# Patient Record
Sex: MALE | Race: BLACK OR AFRICAN AMERICAN | NOT HISPANIC OR LATINO | Employment: FULL TIME | URBAN - METROPOLITAN AREA
[De-identification: names, ages, dates, MRNs, and addresses within clinical notes are randomized per-mention and may not be internally consistent; named-entity substitution may affect disease eponyms.]

---

## 2017-01-04 ENCOUNTER — TELEPHONE (OUTPATIENT)
Dept: PHARMACY | Facility: CLINIC | Age: 27
End: 2017-01-04

## 2017-01-06 ENCOUNTER — TELEPHONE (OUTPATIENT)
Dept: PHARMACY | Facility: CLINIC | Age: 27
End: 2017-01-06

## 2017-01-10 ENCOUNTER — TELEPHONE (OUTPATIENT)
Dept: PHARMACY | Facility: CLINIC | Age: 27
End: 2017-01-10

## 2017-01-11 ENCOUNTER — TELEPHONE (OUTPATIENT)
Dept: PHARMACY | Facility: CLINIC | Age: 27
End: 2017-01-11

## 2017-01-11 NOTE — TELEPHONE ENCOUNTER
[facility] has been unable to reach patient regarding need of Xolair.  I do see that patient has been receiving injections every 14 days via facility administered medication.      Please let us know if this needs to be fulfilled via patient's pharmacy benefit.  If patient does need from pharmacy, please send a prescription for q 14 day dosing.

## 2017-01-11 NOTE — TELEPHONE ENCOUNTER
Has patient's medical benefit started covering his Xolair injections again?  Please advise.     Thanks,     Fre Saab, PharmD   [facility]   [phone] (Routing comment)                        Fer Saab, PharmD 26 minutes ago (3:50 PM)                 [facility] has been unable to reach patient regarding need of Xolair. I do see that patient has been receiving injections every 14 days via facility administered medication.      Please let us know if this needs to be fulfilled via patient's pharmacy benefit. If patient does need from pharmacy, please send a prescription for q 14 day dosing.                  Documentation                          Tania Voss attempted to contact  Oscar Luis Carlos  [phone]  1 hour ago (2:50 PM)                   Outgoing call:  refill call for xoalir, tried reaching patient 4 times to confirm need of refill, no answer, both numbers on file for patient have been called, will f/u by postcard un hopes in reaching patient to confirm need of refill

## 2017-01-13 NOTE — TELEPHONE ENCOUNTER
Patient had mediation from [facility] that was used for his most recent injections.     Patient called and informed to contact [facility] as soon as possible at [phone]. Mr. Aldridge voiced understanding.

## 2017-01-19 ENCOUNTER — TELEPHONE (OUTPATIENT)
Dept: PHARMACY | Facility: CLINIC | Age: 27
End: 2017-01-19

## 2017-01-20 ENCOUNTER — TELEPHONE (OUTPATIENT)
Dept: PHARMACY | Facility: CLINIC | Age: 27
End: 2017-01-20

## 2017-09-11 ENCOUNTER — TELEPHONE (OUTPATIENT)
Dept: PHARMACY | Facility: CLINIC | Age: 27
End: 2017-09-11

## 2017-09-11 NOTE — TELEPHONE ENCOUNTER
[facility] received prescription for Xolair 300mg SQ every 28 days.  Prior authorization is required.      Dosage appropriate based on therapy for Asthma: SubQ: Dose and frequency based on body weight and pretreatment total IgE serum levels.      DDIs: medication list reviewed, none expected with Xolair

## 2017-09-11 NOTE — TELEPHONE ENCOUNTER
Informed patient this is [facility] specialty pharmacy, we have received an order for Xolair from your provider and will contact you once a benefits investigation is complete with copay information, to set up pickup or shipment, and [facility] consultation.  feel free to give us a call with  any questions prior to hearing back from us at [phone]. thank you!_TAMRA 9/11/17

## 2017-09-13 NOTE — TELEPHONE ENCOUNTER
Hello,    Xolair PA denial was overturned for:  Xolair 150mg- Inject 300mg into the skin every 14 days- #4 vials for $3 copay.    Please provide [facility] with the following   1.     Office address  2.     Office hours  3.     Date needed for shipment       So that the Xolair can be shipped to your office.     [facility] can send via same day  (M-F) a few days prior to patients appointment.     Thank you for your time,  Meghann Crawford, Pharm.D  Specialty Pharmacy Clinical Pharmacist  [facility]  Phone: [phone]

## 2017-09-14 NOTE — TELEPHONE ENCOUNTER
Attempted to call patient to inform him of prior authorization approval, voice mail box was full @ 8:52am 9/14/2017. MARCIANO

## 2017-09-15 NOTE — TELEPHONE ENCOUNTER
DOCUMENTATION ONLY  Patient confirmed appointment on 9/19. Obtained credit card information for $3.00 copay. We will  the medication on Monday 9/18 for appointment on Tuesday 9/19. MARCIANO

## 2017-10-12 ENCOUNTER — TELEPHONE (OUTPATIENT)
Dept: PHARMACY | Facility: CLINIC | Age: 27
End: 2017-10-12

## 2017-10-16 ENCOUNTER — TELEPHONE (OUTPATIENT)
Dept: PHARMACY | Facility: CLINIC | Age: 27
End: 2017-10-16

## 2017-10-24 ENCOUNTER — TELEPHONE (OUTPATIENT)
Dept: PHARMACY | Facility: CLINIC | Age: 27
End: 2017-10-24

## 2017-11-07 ENCOUNTER — TELEPHONE (OUTPATIENT)
Dept: PHARMACY | Facility: CLINIC | Age: 27
End: 2017-11-07

## 2017-12-04 ENCOUNTER — TELEPHONE (OUTPATIENT)
Dept: PHARMACY | Facility: CLINIC | Age: 27
End: 2017-12-04

## 2018-01-02 ENCOUNTER — TELEPHONE (OUTPATIENT)
Dept: PHARMACY | Facility: CLINIC | Age: 28
End: 2018-01-02

## 2018-04-23 ENCOUNTER — TELEPHONE (OUTPATIENT)
Dept: PHARMACY | Facility: CLINIC | Age: 28
End: 2018-04-23

## 2018-05-21 ENCOUNTER — TELEPHONE (OUTPATIENT)
Dept: PHARMACY | Facility: CLINIC | Age: 28
End: 2018-05-21

## 2018-05-29 NOTE — TELEPHONE ENCOUNTER
Confirmed Xolair  did not go out as scheduled.on 5/28  Rescheduled for delivery 5/30 via  to [facility] office.

## 2018-07-13 ENCOUNTER — TELEPHONE (OUTPATIENT)
Dept: PHARMACY | Facility: CLINIC | Age: 28
End: 2018-07-13

## 2018-08-14 ENCOUNTER — TELEPHONE (OUTPATIENT)
Dept: PHARMACY | Facility: CLINIC | Age: 28
End: 2018-08-14

## 2018-08-27 NOTE — TELEPHONE ENCOUNTER
DOCUMENTATION ONLY:  Prior Authorization for Xolair approved from 08/27/18 to 08/27/19    Case Id: [id number]    Co-pay: $3.00    Patient Assistance IS NOT required.  TIFFANY        Submitted prior authorization renewal request for Xolair to insurance on 08/27 12:00pm. TIFFANY

## 2018-09-10 ENCOUNTER — TELEPHONE (OUTPATIENT)
Dept: PHARMACY | Facility: CLINIC | Age: 28
End: 2018-09-10

## 2018-09-17 ENCOUNTER — TELEPHONE (OUTPATIENT)
Dept: PHARMACY | Facility: CLINIC | Age: 28
End: 2018-09-17

## 2018-11-01 ENCOUNTER — TELEPHONE (OUTPATIENT)
Dept: PHARMACY | Facility: CLINIC | Age: 28
End: 2018-11-01

## 2018-11-30 ENCOUNTER — TELEPHONE (OUTPATIENT)
Dept: PHARMACY | Facility: CLINIC | Age: 28
End: 2018-11-30

## 2018-12-31 ENCOUNTER — TELEPHONE (OUTPATIENT)
Dept: PHARMACY | Facility: CLINIC | Age: 28
End: 2018-12-31

## 2019-01-03 ENCOUNTER — TELEPHONE (OUTPATIENT)
Dept: PHARMACY | Facility: CLINIC | Age: 29
End: 2019-01-03

## 2019-01-03 NOTE — TELEPHONE ENCOUNTER
Incoming call - Patient called to refill Xolair.  Refill is not needed.  [facility] confirmed patient has 6 vials (3 doses) on hand.  Refill activity pended appropriately.  Patient has not been seen in [facility] since Nov2018.  [facility] is aware of noncompliance.  Patient stated he just came back into town; he has been away for quite some time.  He currently feels SOB due to being off of Xolair.  Adherence was stressed.  He plans on calling [facility] to schedule appt.  New ph# updated in [facility].  Advised to call [facility] or provider should any issues arise.  Pt verbalized understanding.

## 2019-03-04 ENCOUNTER — TELEPHONE (OUTPATIENT)
Dept: PHARMACY | Facility: CLINIC | Age: 29
End: 2019-03-04

## 2019-03-04 NOTE — TELEPHONE ENCOUNTER
left message with friend to have patient give us a call back at [facility] as patient was at work. He stated that patient would give us a call back. Provided [facility] phone number.

## 2019-03-06 NOTE — TELEPHONE ENCOUNTER
Refill and followup call for Xolair. No answer, left voicemail. Sent [employer] message.    Miguel Ascencio, PharmD  Clinical Pharmacist  [facility]  P: [phone]

## 2019-04-08 ENCOUNTER — TELEPHONE (OUTPATIENT)
Dept: PHARMACY | Facility: CLINIC | Age: 29
End: 2019-04-08

## 2019-04-10 ENCOUNTER — TELEPHONE (OUTPATIENT)
Dept: PHARMACY | Facility: CLINIC | Age: 29
End: 2019-04-10

## 2019-05-06 ENCOUNTER — TELEPHONE (OUTPATIENT)
Dept: PHARMACY | Facility: CLINIC | Age: 29
End: 2019-05-06

## 2019-05-08 ENCOUNTER — TELEPHONE (OUTPATIENT)
Dept: PHARMACY | Facility: CLINIC | Age: 29
End: 2019-05-08

## 2019-05-08 NOTE — TELEPHONE ENCOUNTER
RX call regarding Xolair for [facility]. With  on  with consent from patient and Nurse Shilpi Manzanares. Copay 0.00. Patient has not started any new medications, no missed doses/ side effects. Patient did not have any concerns or questions for the pharmacist at this time.  & address verified. Patients next appointment is .      TO  [address] [place] [address].  [facility]  Sarah Reed [address]    ELINA (training)

## 2019-06-12 ENCOUNTER — TELEPHONE (OUTPATIENT)
Dept: PHARMACY | Facility: CLINIC | Age: 29
End: 2019-06-12

## 2019-06-12 NOTE — TELEPHONE ENCOUNTER
Refill and followup call for Xolair. Patient confirmed need of the refill. Will deliver via  on  with patient consent to:  Attn: Dr. Keith Ferrara  [address] [place] [address] [place]  [place]ra[place] DAVISON [address]    Copay $0.00 at 004. Address confirmed. Patient has 0 doses remaining (0 day supply)/ next injection due on or around . Patient denies missed doses and no side effects.  No new medications/allergies/medical conditions. Labs are stable. No ER/Urgent care visits in past month. Patient taking the medication as directed. All questions addressed to patient satisfaction. Confirmed 2 patient identifiers - Name and .    Miguel Ascencio, PharmD  Clinical Pharmacist  [facility]  P: [phone]

## 2019-07-08 ENCOUNTER — TELEPHONE (OUTPATIENT)
Dept: PHARMACY | Facility: CLINIC | Age: 29
End: 2019-07-08

## 2019-07-08 NOTE — TELEPHONE ENCOUNTER
Refill call regarding Xolair at [facility]. Will prepare for  on 7/10 with pt consent. Copay 0.00. Patient has not started any new medications, no missed doses/ side effects. Patient did not have any concerns or questions for the pharmacist at this time.  & address verified. No apt in system   per Kaya Cuevas    [facility]  office in process of moving.

## 2019-08-05 ENCOUNTER — TELEPHONE (OUTPATIENT)
Dept: PHARMACY | Facility: CLINIC | Age: 29
End: 2019-08-05

## 2019-09-11 ENCOUNTER — TELEPHONE (OUTPATIENT)
Dept: PHARMACY | Facility: CLINIC | Age: 29
End: 2019-09-11

## 2019-09-11 NOTE — TELEPHONE ENCOUNTER
Refill call regarding Xolair at [facility]. Will prepare for  on  with pt consent. Copay 0.00.  Patient has not started any new medications, no missed doses/ side effects. Patient did not have any concerns or questions for the pharmacist at this time.  & address verified.  per Kaya Roach  ATTN. Dr. Ferrara or Kaya Cuevas   [facility]   Inpatient Pharmacy

## 2019-10-11 ENCOUNTER — TELEPHONE (OUTPATIENT)
Dept: PHARMACY | Facility: CLINIC | Age: 29
End: 2019-10-11

## 2019-11-08 ENCOUNTER — TELEPHONE (OUTPATIENT)
Dept: PHARMACY | Facility: CLINIC | Age: 29
End: 2019-11-08

## 2019-11-14 ENCOUNTER — TELEPHONE (OUTPATIENT)
Dept: PHARMACY | Facility: CLINIC | Age: 29
End: 2019-11-14

## 2019-11-14 NOTE — TELEPHONE ENCOUNTER
Refill and followup call for Xolair. Patient confirmed need of the refill. Will deliver via  11/15 with patient consent. Copay $3.00 at 004 with auth to charge [facility]. Address confirmed. Patient has 0 doses remaining (0 day supply)/ next injection due . Patient denies missed doses and no side effects.  No new medications/allergies/medical conditions. Labs are stable. No ER/Urgent care visits in past month. Patient taking the medication as directed. Patient denies any further questions. Confirmed 2 patient identifiers - Name and .     Miguel Ascencio, PharmD  Clinical Pharmacist  [facility]  P: [phone]

## 2019-12-12 ENCOUNTER — TELEPHONE (OUTPATIENT)
Dept: PHARMACY | Facility: CLINIC | Age: 29
End: 2019-12-12

## 2019-12-12 NOTE — TELEPHONE ENCOUNTER
Call attempt 1 for Xolair refill and clinical follow up - LVM and MyChart message sent. Copay $3.00 at 004. Injection appointment scheduled for 12/16.    David Christensen, PharmD  Clinical Pharmacist   [facility]   P: [phone]

## 2020-01-17 ENCOUNTER — TELEPHONE (OUTPATIENT)
Dept: PHARMACY | Facility: CLINIC | Age: 30
End: 2020-01-17

## 2020-01-17 NOTE — TELEPHONE ENCOUNTER
RX call attempt 1 regarding Xolair refill from [facility]. Patient reached-- gave permission to send medication to [facility]. Copay of 0.00 @ 004. Name and  confirmed. Facility has 0 doses on hand at this time. Patient has not started any new medications, has had no missed doses and no side effects present. Patient is currently taking the medication as directed by doctors instruction, Inject 300 mg into the skin every 14 (fourteen) days at appointment. Patient also does have the capability of contacting 911 in the event of an emergency. Patient states they do not have any questions or concerns at this time.     Patients appt is scheduled for Monday,  @ 1:00pm.    Emailed Gregg Canada and received permission to  medication to him facility for Thursday,  to the  address of:    Attn: Gregg Canada  [address] [facility]   Inpatient Pharmacy (2nd floor)  [place] [address]

## 2020-03-03 ENCOUNTER — TELEPHONE (OUTPATIENT)
Dept: PHARMACY | Facility: CLINIC | Age: 30
End: 2020-03-03

## 2020-03-11 NOTE — TELEPHONE ENCOUNTER
RX call attempt 3 regarding Xolair refill from [facility]. Patient reached-- gave permission to send medication to [facility]. Copay of 0.00 @ 004. Obtained patients corrected contact information and corrected [employer] and Epic. Name and  confirmed. Facility has 0 doses of medication on hand at this time. Patient has not started any new medications, has had no missed doses and no side effects present. Patient is currently taking the medication as directed by doctors instruction, Inject 300 mg into the skin every 14 (fourteen) days at appointments. Patient also does have the capability of contacting 911 in the event of an emergency. Patient states they do not have any questions or concerns at this time.     Patient does not currently have an appointment but stated his doses are due for Monday, 3/16.     Emailed Gregg Canada for permission to send medication to his facility for Friday, 3/13.. Awaiting a response..

## 2020-04-07 ENCOUNTER — TELEPHONE (OUTPATIENT)
Dept: PHARMACY | Facility: CLINIC | Age: 30
End: 2020-04-07

## 2020-05-06 ENCOUNTER — TELEPHONE (OUTPATIENT)
Dept: PHARMACY | Facility: CLINIC | Age: 30
End: 2020-05-06

## 2020-06-10 ENCOUNTER — TELEPHONE (OUTPATIENT)
Dept: PHARMACY | Facility: CLINIC | Age: 30
End: 2020-06-10

## 2020-06-10 NOTE — TELEPHONE ENCOUNTER
Xolair refill call. Patient confirmed need of the refill. Will deliver to arrive on 20 with patient consent. Copay $0 at 004. Address confirmed. Patient has 0 doses remaining / next injection due . Patient denies missed doses and no side effects.  No new medications/allergies. Does report a new cough at night, but hasn't been to see anyone. Denies fever. No ER/Urgent care visits in past month. No Sharps container needed. Patient taking the medication as directed. Patient denies any further questions. Confirmed 2 patient identifiers - Name and .     Address:  Attn: Gregg Canada   [facility]   Inpatient Pharmacy (2nd floor)  [place] [address]

## 2020-07-08 ENCOUNTER — TELEPHONE (OUTPATIENT)
Dept: PHARMACY | Facility: CLINIC | Age: 30
End: 2020-07-08

## 2020-07-08 NOTE — TELEPHONE ENCOUNTER
Call attempt 1 for Xolair refill. No answer; LVM. Mychart message sent. $0.00 copay at 004. Next dose should be due on 7/15.     Daljit Rehman, PharmD  Clinical Pharmacist  [facility]  P: [phone]

## 2020-07-10 NOTE — TELEPHONE ENCOUNTER
Refill call for Xolair. Patient authorized to deliver to injection location prior to next appointment - next dose due on 7/15. Delivering via  on Monday 7/13. $0.00 copay at 004.     Daljit Rehman, PharmD  Clinical Pharmacist  [facility]  P: [phone]

## 2020-08-05 ENCOUNTER — TELEPHONE (OUTPATIENT)
Dept: PHARMACY | Facility: CLINIC | Age: 30
End: 2020-08-05

## 2020-08-05 NOTE — TELEPHONE ENCOUNTER
Call attempt 1 for Xolair refill - LVM and MyChart message sent. Copay $0 at 004. Appt should be for 8/12.    David Christensen, PharmD  Clinical Pharmacist   [facility]   P: [phone]

## 2020-08-07 NOTE — TELEPHONE ENCOUNTER
Refill call for Xolair. Spoke to patient. Patient authorized delivery of Xolair to [facility] prior to next injection date. Next dose due on Wednesday 8/12. Delivering via  on Monday 8/10 to [facility]. $0.00 copay at 004.     Daljit Rehman, PharmD  Clinical Pharmacist  [facility]  P: [phone]

## 2020-09-29 ENCOUNTER — TELEPHONE (OUTPATIENT)
Dept: PHARMACY | Facility: CLINIC | Age: 30
End: 2020-09-29

## 2020-09-29 NOTE — TELEPHONE ENCOUNTER
Call attempt 1 for Xolair refill. No answer; LVM. $0.00 copay at 004. Next appointment should be on/around 10/6.     Daljit Rehman, PharmD  Clinical Pharmacist  [facility]  P: [phone]

## 2020-10-27 ENCOUNTER — SPECIALTY PHARMACY (OUTPATIENT)
Dept: PHARMACY | Facility: CLINIC | Age: 30
End: 2020-10-27

## 2020-10-27 DIAGNOSIS — J45.50 SEVERE PERSISTENT ASTHMA, UNSPECIFIED WHETHER COMPLICATED: Primary | ICD-10-CM

## 2020-10-27 NOTE — TELEPHONE ENCOUNTER
Specialty Pharmacy - Refill Coordination    Specialty Medication Orders Linked to Encounter      Most Recent Value   Medication #1  omalizumab (XOLAIR) 150 mg injection (Order#[id number], Rx#[id number])        Refill Questions - Documented Responses      Most Recent Value   Relationship to patient of person spoken to?  Self   HIPAA/medical authority confirmed?  Yes   Any changes in contact preferences or allowed representatives?  No   Has the patient had any insurance changes?  No   Has the patient had any changes to specialty medication, dose, or instructions?  No   Has the patient started taking any new medications, herbals, or supplements?  No   Has the patient been diagnosed with any new medical conditions?  No   Does the patient have any new allergies to medications or foods?  No   Does the patient have any concerns about side effects?  No   Can the patient store medication/sharps container properly (at the correct temperature, away from children/pets, etc.)?  Yes   Can the patient call emergency services (911) in the event of an emergency?  Yes   Does the patient have any concerns or questions about taking or administering this medication as prescribed?  No   How many doses did the patient miss in the past 4 weeks or since the last fill?  0   How many doses does the patient have on hand?  0   How many days does the patient report on hand quantity will last?  0   Does the number of doses/days supply remaining match pharmacy expected amounts?  Yes   How will the patient receive the medication?  [facility] [Attn: Gregg Canada [address] [place] [address] [place] [address]]   When does the patient need to receive the medication?  11/03/20   Shipping Address  Home   Address in [facility] confirmed and updated if neccessary?  Yes   Expected Copay ($)  0   Is the patient able to afford the medication copay?  Yes   Payment Method  zero copay   Days supply of Refill  28   Would patient like to speak to a  pharmacist?  No   Do you want to trigger an intervention?  No   Do you want to trigger an additional referral task?  No   Refill activity completed?  Yes   Refill activity plan  Refill scheduled   Shipment/Pickup Date:  10/30/20          Current Outpatient Medications   Medication Sig    albuterol (PROVENTIL) 2.5 mg /3 mL (0.083 %) nebulizer solution USE 1 VIAL VIA NEBULIZER EVERY 4 TO 6 HOURS AS NEEDED    albuterol (PROVENTIL/VENTOLIN HFA) 90 mcg/actuation inhaler Inhale 2 puffs into the lungs every 6 (six) hours as needed for Wheezing. Rescue    fluticasone furoate-vilanteroL (BREO ELLIPTA) 200-25 mcg/dose DsDv diskus inhaler Inhale 1 puff into the lungs once daily. Controller    fluticasone propionate (FLONASE) 50 mcg/actuation nasal spray 2 sprays (100 mcg total) by Each Nostril route once daily.    montelukast (SINGULAIR) 10 mg tablet TAKE 1 TABLET(10 MG) BY MOUTH EVERY EVENING    omalizumab (XOLAIR) 150 mg injection Inject 300 mg into the skin every 14 (fourteen) days. for 24 doses   Last reviewed on 7/15/2020  2:32 PM by Margarita Mooney NP    Review of patient's allergies indicates:   Allergen Reactions    Nicole-seltzer [aspirin-sod bicarb-citric acid]     Diphenhydramine hcl      ASTHMA    Last reviewed on  10/20/2020 1:08 PM by Beni Murrell      Tasks added this encounter   No tasks added.   Tasks due within next 3 months   10/27/2020 - Refill Call     Patient's next injection appointment is on 11/3. [facility] confirmed with Gregg Canada [facility].    [facility] Address:  Attn: Gregg Canada  [address] [facility]   Inpatient Pharmacy (2nd floor)  [place] [address]    David Christensen PharmD  [facility]  [address] [place] [id number]  Phone: [phone]  Fax: [phone]

## 2020-11-25 ENCOUNTER — SPECIALTY PHARMACY (OUTPATIENT)
Dept: PHARMACY | Facility: CLINIC | Age: 30
End: 2020-11-25

## 2020-11-25 DIAGNOSIS — J45.50 SEVERE PERSISTENT ASTHMA, UNSPECIFIED WHETHER COMPLICATED: Primary | ICD-10-CM

## 2020-11-25 NOTE — TELEPHONE ENCOUNTER
Specialty Pharmacy - Refill Coordination    Specialty Medication Orders Linked to Encounter      Most Recent Value   Medication #1  omalizumab (XOLAIR) 150 mg injection (Order#[id number], Rx#[id number])        Refill Questions - Documented Responses      Most Recent Value   Relationship to patient of person spoken to?  Self   HIPAA/medical authority confirmed?  Yes   Any changes in contact preferences or allowed representatives?  No   Has the patient had any insurance changes?  No   Has the patient had any changes to specialty medication, dose, or instructions?  No   Has the patient started taking any new medications, herbals, or supplements?  No   Has the patient been diagnosed with any new medical conditions?  No   Does the patient have any new allergies to medications or foods?  No   Does the patient have any concerns about side effects?  No   Can the patient store medication/sharps container properly (at the correct temperature, away from children/pets, etc.)?  Yes   Can the patient call emergency services (911) in the event of an emergency?  Yes   Does the patient have any concerns or questions about taking or administering this medication as prescribed?  No   How many doses did the patient miss in the past 4 weeks or since the last fill?  0   How many doses does the patient have on hand?  0   How many days does the patient report on hand quantity will last?  7   Does the number of doses/days supply remaining match pharmacy expected amounts?  Yes   How will the patient receive the medication?  Mail   When does the patient need to receive the medication?  12/02/20   Shipping Address  Prescription   Address in [facility] confirmed and updated if neccessary?  Yes   Expected Copay ($)  0   Is the patient able to afford the medication copay?  Yes   Payment Method  zero copay   Days supply of Refill  28   Would patient like to speak to a pharmacist?  No   Do you want to trigger an intervention?  No   Do you  want to trigger an additional referral task?  No   Refill activity completed?  Yes   Refill activity plan  Refill scheduled   Shipment/Pickup Date:  11/30/20        Tasks added this encounter   12/23/2020 - Refill Call (Auto Added)   Tasks due within next 3 months   No tasks due.     Patient states provider was supposed to send a Rx for Prednisone to his local pharmacy to use PRN, as he is having some flares with the weather changing. InBasket sent to provider.     David Christensen PharmD  [facility]  [address] [place] [id number]  Phone: [phone]  Fax: [phone]

## 2020-12-23 ENCOUNTER — SPECIALTY PHARMACY (OUTPATIENT)
Dept: PHARMACY | Facility: CLINIC | Age: 30
End: 2020-12-23

## 2020-12-23 NOTE — TELEPHONE ENCOUNTER
Xolair reauthoriildefonso submitted 12/23/20  CMM Key [id number]    Patient's next injection appt scheduled for 12/31

## 2020-12-24 NOTE — TELEPHONE ENCOUNTER
Specialty Pharmacy - Refill Coordination  Specialty Pharmacy - Medication/Referral Authorization    Specialty Medication Orders Linked to Encounter      Most Recent Value   Medication #1  omalizumab (XOLAIR) 150 mg injection (Order#[id number], Rx#[id number])          Refill Questions - Documented Responses      Most Recent Value   Relationship to patient of person spoken to?  Self   HIPAA/medical authority confirmed?  Yes   Any changes in contact preferences or allowed representatives?  No   Has the patient had any insurance changes?  No   Has the patient had any changes to specialty medication, dose, or instructions?  No   Has the patient started taking any new medications, herbals, or supplements?  No   Has the patient been diagnosed with any new medical conditions?  No   Does the patient have any new allergies to medications or foods?  No   Does the patient have any concerns about side effects?  No   Can the patient store medication/sharps container properly (at the correct temperature, away from children/pets, etc.)?  Yes   Can the patient call emergency services (911) in the event of an emergency?  Yes   Does the patient have any concerns or questions about taking or administering this medication as prescribed?  No   How many doses did the patient miss in the past 4 weeks or since the last fill?  0   How many doses does the patient have on hand?  0   How many days does the patient report on hand quantity will last?  8   Does the number of doses/days supply remaining match pharmacy expected amounts?  Yes   Does the patient feel that this medication is effective?  Yes   During the past 4 weeks, has patient missed any activities due to condition or medication?  No   During the past 4 weeks, did patient have any of the following urgent care visits?  None   How will the patient receive the medication?     When does the patient need to receive the medication?  12/31/20   Shipping Address  Prescription    Address in [facility] confirmed and updated if neccessary?  Yes   Expected Copay ($)  0   Is the patient able to afford the medication copay?  Yes   Payment Method  zero copay   Days supply of Refill  28   Would patient like to speak to a pharmacist?  No   Do you want to trigger an intervention?  No   Do you want to trigger an additional referral task?  No   Refill activity completed?  Yes   Refill activity plan  Refill scheduled   Shipment/Pickup Date:  12/29/20        Tasks added this encounter   1/21/2021 - Refill Call (Auto Added)   Tasks due within next 3 months   No tasks due.     David Christensen, Kirsten  [facility]  [address] [place] [id number]  Phone: [phone]  Fax: [phone]

## 2021-01-22 ENCOUNTER — SPECIALTY PHARMACY (OUTPATIENT)
Dept: PHARMACY | Facility: CLINIC | Age: 31
End: 2021-01-22

## 2021-01-22 DIAGNOSIS — J45.50 SEVERE PERSISTENT ASTHMA, UNSPECIFIED WHETHER COMPLICATED: Primary | ICD-10-CM

## 2021-02-23 ENCOUNTER — SPECIALTY PHARMACY (OUTPATIENT)
Dept: PHARMACY | Facility: CLINIC | Age: 31
End: 2021-02-23

## 2021-03-08 ENCOUNTER — PATIENT MESSAGE (OUTPATIENT)
Dept: PHARMACY | Facility: CLINIC | Age: 31
End: 2021-03-08

## 2021-03-15 ENCOUNTER — SPECIALTY PHARMACY (OUTPATIENT)
Dept: PHARMACY | Facility: CLINIC | Age: 31
End: 2021-03-15

## 2021-04-13 ENCOUNTER — PATIENT MESSAGE (OUTPATIENT)
Dept: PHARMACY | Facility: CLINIC | Age: 31
End: 2021-04-13

## 2021-04-13 ENCOUNTER — SPECIALTY PHARMACY (OUTPATIENT)
Dept: PHARMACY | Facility: CLINIC | Age: 31
End: 2021-04-13

## 2021-05-07 ENCOUNTER — SPECIALTY PHARMACY (OUTPATIENT)
Dept: PHARMACY | Facility: CLINIC | Age: 31
End: 2021-05-07

## 2021-05-31 ENCOUNTER — PATIENT MESSAGE (OUTPATIENT)
Dept: PHARMACY | Facility: CLINIC | Age: 31
End: 2021-05-31

## 2021-06-10 ENCOUNTER — SPECIALTY PHARMACY (OUTPATIENT)
Dept: PHARMACY | Facility: CLINIC | Age: 31
End: 2021-06-10

## 2021-06-10 ENCOUNTER — PATIENT MESSAGE (OUTPATIENT)
Dept: PHARMACY | Facility: CLINIC | Age: 31
End: 2021-06-10

## 2021-07-12 ENCOUNTER — SPECIALTY PHARMACY (OUTPATIENT)
Dept: PHARMACY | Facility: CLINIC | Age: 31
End: 2021-07-12

## 2021-07-12 DIAGNOSIS — J45.50 SEVERE PERSISTENT ASTHMA, UNSPECIFIED WHETHER COMPLICATED: Primary | ICD-10-CM

## 2021-08-03 ENCOUNTER — PATIENT MESSAGE (OUTPATIENT)
Dept: PHARMACY | Facility: CLINIC | Age: 31
End: 2021-08-03

## 2021-08-06 ENCOUNTER — SPECIALTY PHARMACY (OUTPATIENT)
Dept: PHARMACY | Facility: CLINIC | Age: 31
End: 2021-08-06

## 2021-09-09 ENCOUNTER — SPECIALTY PHARMACY (OUTPATIENT)
Dept: PHARMACY | Facility: CLINIC | Age: 31
End: 2021-09-09

## 2021-10-06 ENCOUNTER — SPECIALTY PHARMACY (OUTPATIENT)
Dept: PHARMACY | Facility: CLINIC | Age: 31
End: 2021-10-06

## 2021-10-25 ENCOUNTER — SPECIALTY PHARMACY (OUTPATIENT)
Dept: PHARMACY | Facility: CLINIC | Age: 31
End: 2021-10-25
Payer: MEDICAID

## 2021-10-27 ENCOUNTER — SPECIALTY PHARMACY (OUTPATIENT)
Dept: PHARMACY | Facility: CLINIC | Age: 31
End: 2021-10-27
Payer: MEDICAID

## 2021-10-27 DIAGNOSIS — J45.50 SEVERE PERSISTENT ASTHMA, UNSPECIFIED WHETHER COMPLICATED: Primary | ICD-10-CM

## 2021-10-29 ENCOUNTER — PATIENT MESSAGE (OUTPATIENT)
Dept: PHARMACY | Facility: CLINIC | Age: 31
End: 2021-10-29
Payer: MEDICAID

## 2021-11-10 ENCOUNTER — SPECIALTY PHARMACY (OUTPATIENT)
Dept: PHARMACY | Facility: CLINIC | Age: 31
End: 2021-11-10
Payer: MEDICAID

## 2021-12-06 ENCOUNTER — SPECIALTY PHARMACY (OUTPATIENT)
Dept: PHARMACY | Facility: CLINIC | Age: 31
End: 2021-12-06
Payer: MEDICAID

## 2021-12-21 ENCOUNTER — SPECIALTY PHARMACY (OUTPATIENT)
Dept: PHARMACY | Facility: CLINIC | Age: 31
End: 2021-12-21
Payer: MEDICAID

## 2022-01-03 ENCOUNTER — SPECIALTY PHARMACY (OUTPATIENT)
Dept: PHARMACY | Facility: CLINIC | Age: 32
End: 2022-01-03
Payer: MEDICAID

## 2022-01-08 ENCOUNTER — SPECIALTY PHARMACY (OUTPATIENT)
Dept: PHARMACY | Facility: CLINIC | Age: 32
End: 2022-01-08
Payer: MEDICAID

## 2022-01-08 NOTE — TELEPHONE ENCOUNTER
Patient complained of chest pain and heart flutter. He denied SOB.  Patient stated he has no CV history.  Recommended for patient to get medical attention as soon as possible given his symptoms.  He needs to get physical exam/diagnosis.  Encouraged patient to consider going to ER.  He agreed.  Advised patient once he is evaluated,  to call us if he has has med changes or new conditions diagnosed.  Will route to assigned [facility] for follow up.

## 2022-01-08 NOTE — TELEPHONE ENCOUNTER
Specialty Pharmacy - Refill Coordination    Specialty Medication Orders Linked to Encounter    Flowsheet Row Most Recent Value   Medication #1 omalizumab (XOLAIR) 150 mg/mL injection (Order#[id number], Rx#[id number])          Refill Questions - Documented Responses    Flowsheet Row Most Recent Value   Patient Availability and HIPAA Verification    Does patient want to proceed with activity? Yes   HIPAA/medical authority confirmed? Yes   Relationship to patient of person spoken to? Self   Refill Screening Questions    Changes to allergies? No   Changes to medications? No   New conditions since last clinic visit? No   Unplanned office visit, urgent care, ED, or hospital admission in the last 4 weeks? No   How does patient/caregiver feel medication is working? Good   Financial problems or insurance changes? No   How many doses of your specialty medications were missed in the last 4 weeks? 0   Would patient like to speak to a pharmacist? Yes  [transferred to [facility]]   When does the patient need to receive the medication? 01/11/22   Refill Delivery Questions    How will the patient receive the medication?    When does the patient need to receive the medication? 01/11/22   Shipping Address Prescription   Address in [facility] confirmed and updated if neccessary? Yes   Expected Copay ($) 0   Is the patient able to afford the medication copay? Yes   Payment Method zero copay   Days supply of Refill 28   Supplies needed? No supplies needed   Refill activity completed? Yes   Refill activity plan Refill scheduled          Current Outpatient Medications   Medication Sig    albuterol (ACCUNEB) 1.25 mg/3 mL Nebu Take 3 mLs (1.25 mg total) by nebulization every 6 (six) hours as needed (cough, wheeze). Rescue    albuterol (PROAIR HFA) 90 mcg/actuation inhaler Inhale 2 puffs into the lungs every 6 (six) hours as needed for Wheezing. Rescue    albuterol (PROVENTIL) 2.5 mg /3 mL (0.083 %) nebulizer solution Take 3  mLs (2.5 mg total) by nebulization every 6 (six) hours as needed for Wheezing or Shortness of Breath. Rescue    dupilumab (DUPIXENT PEN) 300 mg/2 mL PnIj Inject 300 mg into the skin every 14 (fourteen) days.    famotidine (PEPCID) 20 MG tablet Take 1 tablet (20 mg total) by mouth 2 (two) times daily. Take with naproxen for 7 days    fluticasone furoate-vilanteroL (BREO ELLIPTA) 200-25 mcg/dose DsDv diskus inhaler Inhale 1 puff into the lungs once daily. Controller    fluticasone-salmeterol diskus inhaler 500-50 mcg Inhale 1 puff into the lungs 2 (two) times daily. Controller    montelukast (SINGULAIR) 10 mg tablet Take 1 tablet (10 mg total) by mouth every evening.    naproxen (NAPROSYN) 500 MG tablet Take 1 tablet (500 mg total) by mouth 2 (two) times daily with meals.    omalizumab (XOLAIR) 150 mg/mL injection Inject 2 mLs (300 mg total) into the skin every 14 (fourteen) days.    predniSONE (DELTASONE) 10 MG tablet Take 4 tablets by mouth x3 days, then 3 tablets x3 days, then 2 tablets x3 days, then 1 tablet x3 days    SPIRIVA WITH HANDIHALER 18 mcg inhalation capsule 1 inhalation once daily   Last reviewed on 10/29/2021  4:52 PM by David Christensen, Kirsten    Review of patient's allergies indicates:   Allergen Reactions    Nicole-seltzer [aspirin-sod bicarb-citric acid]     Diphenhydramine hcl      ASTHMA    Last reviewed on  12/28/2021 1:39 PM by Carine Nielsen      Tasks added this encounter   2/1/2022 - Refill Call (Auto Added)  1/8/2022 -  Setup Confirmation   Tasks due within next 3 months   1/20/2022 - Clinical - Follow Up Assesement (90 day)  1/13/2022 - Refill Call (Auto Added)     Par[facility] Cabrera - Specialty Pharmacy  [address] [place] [id number]  Phone: [phone]  Fax: [phone]

## 2022-01-13 ENCOUNTER — SPECIALTY PHARMACY (OUTPATIENT)
Dept: PHARMACY | Facility: CLINIC | Age: 32
End: 2022-01-13
Payer: MEDICAID

## 2022-01-13 NOTE — TELEPHONE ENCOUNTER
Specialty Pharmacy - Refill Coordination    Specialty Medication Orders Linked to Encounter    Flowsheet Row Most Recent Value   Medication #1 dupilumab (DUPIXENT PEN) 300 mg/2 mL PnIj (Order#[id number], Rx#[id number])          Refill Questions - Documented Responses    Flowsheet Row Most Recent Value   Patient Availability and HIPAA Verification    Does patient want to proceed with activity? Yes   HIPAA/medical authority confirmed? Yes   Relationship to patient of person spoken to? Self   Refill Screening Questions    Changes to allergies? No   Changes to medications? No   New conditions since last clinic visit? No   Unplanned office visit, urgent care, ED, or hospital admission in the last 4 weeks? No   How does patient/caregiver feel medication is working? Good   Financial problems or insurance changes? No   How many doses of your specialty medications were missed in the last 4 weeks? 0   Would patient like to speak to a pharmacist? No   When does the patient need to receive the medication? 01/20/22   Refill Delivery Questions    How will the patient receive the medication? Delivery [place]   When does the patient need to receive the medication? 01/20/22   Shipping Address Home   Address in [facility] confirmed and updated if neccessary? Yes   Expected Copay ($) 0   Is the patient able to afford the medication copay? Yes   Payment Method zero copay   Days supply of Refill 28   Supplies needed? No supplies needed   Refill activity completed? Yes   Refill activity plan Refill scheduled   Shipment/Pickup Date: 01/19/22          Current Outpatient Medications   Medication Sig    albuterol (ACCUNEB) 1.25 mg/3 mL Nebu Take 3 mLs (1.25 mg total) by nebulization every 6 (six) hours as needed (cough, wheeze). Rescue    albuterol (PROAIR HFA) 90 mcg/actuation inhaler Inhale 2 puffs into the lungs every 6 (six) hours as needed for Wheezing. Rescue    albuterol (PROVENTIL) 2.5 mg /3 mL (0.083 %) nebulizer solution  Take 3 mLs (2.5 mg total) by nebulization every 6 (six) hours as needed for Wheezing or Shortness of Breath. Rescue    dupilumab (DUPIXENT PEN) 300 mg/2 mL PnIj Inject 300 mg into the skin every 14 (fourteen) days.    famotidine (PEPCID) 20 MG tablet Take 1 tablet (20 mg total) by mouth 2 (two) times daily. Take with naproxen for 7 days    fluticasone furoate-vilanteroL (BREO ELLIPTA) 200-25 mcg/dose DsDv diskus inhaler Inhale 1 puff into the lungs once daily. Controller    fluticasone-salmeterol diskus inhaler 500-50 mcg Inhale 1 puff into the lungs 2 (two) times daily. Controller    montelukast (SINGULAIR) 10 mg tablet Take 1 tablet (10 mg total) by mouth every evening.    naproxen (NAPROSYN) 500 MG tablet Take 1 tablet (500 mg total) by mouth 2 (two) times daily with meals.    omalizumab (XOLAIR) 150 mg/mL injection Inject 2 mLs (300 mg total) into the skin every 14 (fourteen) days.    predniSONE (DELTASONE) 10 MG tablet Take 4 tablets by mouth x3 days, then 3 tablets x3 days, then 2 tablets x3 days, then 1 tablet x3 days    SPIRIVA WITH HANDIHALER 18 mcg inhalation capsule 1 inhalation once daily   Last reviewed on 10/29/2021  4:52 PM by David Christensen, Kirsten    Review of patient's allergies indicates:   Allergen Reactions    Nicole-seltzer [aspirin-sod bicarb-citric acid]     Diphenhydramine hcl      ASTHMA    Last reviewed on  12/28/2021 1:39 PM by Carine Nielsen      Tasks added this encounter   2/6/2022 - Refill Call (Auto Added)   Tasks due within next 3 months   1/20/2022 - Clinical - Follow Up Assesement (90 day)  2/1/2022 - Refill Call (Auto Added)     Margaret Han On license of [facility] - Specialty Pharmacy  [address] [place] [id number]  Phone: [phone]  Fax: [phone]

## 2022-02-01 ENCOUNTER — SPECIALTY PHARMACY (OUTPATIENT)
Dept: PHARMACY | Facility: CLINIC | Age: 32
End: 2022-02-01
Payer: MEDICAID

## 2022-02-07 NOTE — TELEPHONE ENCOUNTER
Specialty Pharmacy - Refill Coordination    Specialty Medication Orders Linked to Encounter    Flowsheet Row Most Recent Value   Medication #1 dupilumab (DUPIXENT PEN) 300 mg/2 mL PnIj (Order#[id number], Rx#[id number])          Refill Questions - Documented Responses    Flowsheet Row Most Recent Value   Patient Availability and HIPAA Verification    Does patient want to proceed with activity? Yes   HIPAA/medical authority confirmed? Yes   Relationship to patient of person spoken to? Self   Refill Screening Questions    Changes to allergies? No   Changes to medications? No   New conditions since last clinic visit? No   Unplanned office visit, urgent care, ED, or hospital admission in the last 4 weeks? No   How does patient/caregiver feel medication is working? Very good   Financial problems or insurance changes? No   How many doses of your specialty medications were missed in the last 4 weeks? 0   Would patient like to speak to a pharmacist? No   When does the patient need to receive the medication? 02/09/22   Refill Delivery Questions    How will the patient receive the medication? Delivery [place]   When does the patient need to receive the medication? 02/09/22   Shipping Address Prescription   Address in [facility] confirmed and updated if neccessary? Yes   Expected Copay ($) 0   Is the patient able to afford the medication copay? Yes   Payment Method zero copay   Days supply of Refill 28   Supplies needed? No supplies needed   Refill activity completed? Yes   Refill activity plan Refill scheduled   Shipment/Pickup Date: 02/08/22          Current Outpatient Medications   Medication Sig    albuterol (ACCUNEB) 1.25 mg/3 mL Nebu Take 3 mLs (1.25 mg total) by nebulization every 6 (six) hours as needed (cough, wheeze). Rescue    albuterol (PROAIR HFA) 90 mcg/actuation inhaler Inhale 2 puffs into the lungs every 6 (six) hours as needed for Wheezing. Rescue    albuterol (PROVENTIL) 2.5 mg /3 mL (0.083 %) nebulizer  solution Take 3 mLs (2.5 mg total) by nebulization every 6 (six) hours as needed for Wheezing or Shortness of Breath. Rescue    dupilumab (DUPIXENT PEN) 300 mg/2 mL PnIj Inject 300 mg into the skin every 14 (fourteen) days.    famotidine (PEPCID) 20 MG tablet Take 1 tablet (20 mg total) by mouth 2 (two) times daily. Take with naproxen for 7 days    fluticasone furoate-vilanteroL (BREO ELLIPTA) 200-25 mcg/dose DsDv diskus inhaler Inhale 1 puff into the lungs once daily. Controller    fluticasone-salmeterol diskus inhaler 500-50 mcg Inhale 1 puff into the lungs 2 (two) times daily. Controller    montelukast (SINGULAIR) 10 mg tablet Take 1 tablet (10 mg total) by mouth every evening.    naproxen (NAPROSYN) 500 MG tablet Take 1 tablet (500 mg total) by mouth 2 (two) times daily with meals.    omalizumab (XOLAIR) 150 mg/mL injection Inject 2 mLs (300 mg total) into the skin every 14 (fourteen) days.    predniSONE (DELTASONE) 10 MG tablet Take 4 tablets by mouth x3 days, then 3 tablets x3 days, then 2 tablets x3 days, then 1 tablet x3 days    SPIRIVA WITH HANDIHALER 18 mcg inhalation capsule 1 inhalation once daily   Last reviewed on 10/29/2021  4:52 PM by David Christensen, Kirsten    Review of patient's allergies indicates:   Allergen Reactions    Nicole-seltzer [aspirin-sod bicarb-citric acid]     Diphenhydramine hcl      ASTHMA    Last reviewed on  1/13/2022 1:37 PM by Jessica Brown      Tasks added this encounter   3/2/2022 - Refill Call (Auto Added)  3/2/2022 - Refill Call (Auto Added)   Tasks due within next 3 months   No tasks due.     Ana Matos, PharmD  [facility] - Specialty Pharmacy  [address] [place] [id number]  Phone: [phone]  Fax: [phone]

## 2022-03-03 ENCOUNTER — SPECIALTY PHARMACY (OUTPATIENT)
Dept: PHARMACY | Facility: CLINIC | Age: 32
End: 2022-03-03
Payer: MEDICAID

## 2022-03-03 NOTE — TELEPHONE ENCOUNTER
Specialty Pharmacy - Refill Coordination    Specialty Medication Orders Linked to Encounter    Flowsheet Row Most Recent Value   Medication #1 dupilumab (DUPIXENT PEN) 300 mg/2 mL PnIj (Order#[id number], Rx#[id number])          Refill Questions - Documented Responses    Flowsheet Row Most Recent Value   Patient Availability and HIPAA Verification    Does patient want to proceed with activity? Yes   HIPAA/medical authority confirmed? Yes   Relationship to patient of person spoken to? Self   Refill Screening Questions    Changes to allergies? No   Changes to medications? No   New conditions since last clinic visit? No   Unplanned office visit, urgent care, ED, or hospital admission in the last 4 weeks? No   How does patient/caregiver feel medication is working? Good   Financial problems or insurance changes? No   How many doses of your specialty medications were missed in the last 4 weeks? 0   Would patient like to speak to a pharmacist? No   When does the patient need to receive the medication? 03/11/22   Refill Delivery Questions    How will the patient receive the medication? Delivery [place]   When does the patient need to receive the medication? 03/11/22   Shipping Address Home   Address in [facility] confirmed and updated if neccessary? Yes   Expected Copay ($) 0   Is the patient able to afford the medication copay? Yes   Payment Method zero copay   Days supply of Refill 28   Supplies needed? No supplies needed   Refill activity completed? Yes   Refill activity plan Refill scheduled   Shipment/Pickup Date: 03/11/22          Current Outpatient Medications   Medication Sig    albuterol (ACCUNEB) 1.25 mg/3 mL Nebu Take 3 mLs (1.25 mg total) by nebulization every 6 (six) hours as needed (cough, wheeze). Rescue    albuterol (PROAIR HFA) 90 mcg/actuation inhaler Inhale 2 puffs into the lungs every 6 (six) hours as needed for Wheezing. Rescue    albuterol (PROVENTIL) 2.5 mg /3 mL (0.083 %) nebulizer solution  Take 3 mLs (2.5 mg total) by nebulization every 6 (six) hours as needed for Wheezing or Shortness of Breath. Rescue    dupilumab (DUPIXENT PEN) 300 mg/2 mL PnIj Inject 300 mg into the skin every 14 (fourteen) days.    famotidine (PEPCID) 20 MG tablet Take 1 tablet (20 mg total) by mouth 2 (two) times daily. Take with naproxen for 7 days    fluticasone furoate-vilanteroL (BREO ELLIPTA) 200-25 mcg/dose DsDv diskus inhaler Inhale 1 puff into the lungs once daily. Controller    fluticasone-salmeterol diskus inhaler 500-50 mcg Inhale 1 puff into the lungs 2 (two) times daily. Controller    montelukast (SINGULAIR) 10 mg tablet Take 1 tablet (10 mg total) by mouth every evening.    naproxen (NAPROSYN) 500 MG tablet Take 1 tablet (500 mg total) by mouth 2 (two) times daily with meals.    omalizumab (XOLAIR) 150 mg/mL injection Inject 2 mLs (300 mg total) into the skin every 14 (fourteen) days.    predniSONE (DELTASONE) 10 MG tablet Take 4 tablets by mouth x3 days, then 3 tablets x3 days, then 2 tablets x3 days, then 1 tablet x3 days    SPIRIVA WITH HANDIHALER 18 mcg inhalation capsule 1 inhalation once daily   Last reviewed on 10/29/2021  4:52 PM by David Christensen, PharmD    Review of patient's allergies indicates:   Allergen Reactions    Nicole-seltzer [aspirin-sod bicarb-citric acid]     Diphenhydramine hcl      ASTHMA    Last reviewed on  2/14/2022 10:54 AM by Carine Nielsen      Tasks added this encounter   3/26/2022 - Refill Call (Auto Added)  4/1/2022 - Refill Call (Auto Added)   Tasks due within next 3 months   3/2/2022 - Refill Call (Auto Added)     Maylin aHn [facility] - Specialty Pharmacy  [address] [place] [id number]  Phone: [phone]  Fax: [phone]

## 2022-03-09 ENCOUNTER — SPECIALTY PHARMACY (OUTPATIENT)
Dept: PHARMACY | Facility: CLINIC | Age: 32
End: 2022-03-09
Payer: MEDICAID

## 2022-03-09 NOTE — TELEPHONE ENCOUNTER
Specialty Pharmacy - Refill Coordination    Specialty Medication Orders Linked to Encounter    Flowsheet Row Most Recent Value   Medication #1 omalizumab (XOLAIR) 150 mg/mL injection (Order#[id number], Rx#[id number])        Patient missed his Xolair appointment on 2/28. He states he started a new job and has been busy. However, he plans to call to reschedule soon.     Refill Questions - Documented Responses    Flowsheet Row Most Recent Value   Patient Availability and HIPAA Verification    Does patient want to proceed with activity? Yes   HIPAA/medical authority confirmed? Yes   Relationship to patient of person spoken to? Self   Refill Screening Questions    Changes to allergies? No   Changes to medications? No   New conditions since last clinic visit? No   Unplanned office visit, urgent care, ED, or hospital admission in the last 4 weeks? No   How does patient/caregiver feel medication is working? Good   Financial problems or insurance changes? No   How many doses of your specialty medications were missed in the last 4 weeks? 1   Why were doses missed? Had a change in daily routine   Would patient like to speak to a pharmacist? No   When does the patient need to receive the medication? 03/11/22   Refill Delivery Questions    How will the patient receive the medication?    When does the patient need to receive the medication? 03/11/22   Expected Copay ($) 0   Is the patient able to afford the medication copay? Yes   Payment Method zero copay   Days supply of Refill 28   Supplies needed? No supplies needed   Refill activity completed? Yes   Refill activity plan Refill scheduled   Shipment/Pickup Date: 03/11/22          Current Outpatient Medications   Medication Sig    albuterol (ACCUNEB) 1.25 mg/3 mL Nebu Take 3 mLs (1.25 mg total) by nebulization every 6 (six) hours as needed (cough, wheeze). Rescue    albuterol (PROAIR HFA) 90 mcg/actuation inhaler Inhale 2 puffs into the lungs every 6 (six) hours as  needed for Wheezing. Rescue    albuterol (PROVENTIL) 2.5 mg /3 mL (0.083 %) nebulizer solution Take 3 mLs (2.5 mg total) by nebulization every 6 (six) hours as needed for Wheezing or Shortness of Breath. Rescue    dupilumab (DUPIXENT PEN) 300 mg/2 mL PnIj Inject 300 mg into the skin every 14 (fourteen) days.    famotidine (PEPCID) 20 MG tablet Take 1 tablet (20 mg total) by mouth 2 (two) times daily. Take with naproxen for 7 days    fluticasone furoate-vilanteroL (BREO ELLIPTA) 200-25 mcg/dose DsDv diskus inhaler Inhale 1 puff into the lungs once daily. Controller    fluticasone-salmeterol diskus inhaler 500-50 mcg Inhale 1 puff into the lungs 2 (two) times daily. Controller    montelukast (SINGULAIR) 10 mg tablet Take 1 tablet (10 mg total) by mouth every evening.    naproxen (NAPROSYN) 500 MG tablet Take 1 tablet (500 mg total) by mouth 2 (two) times daily with meals.    omalizumab (XOLAIR) 150 mg/mL injection Inject 2 mLs (300 mg total) into the skin every 14 (fourteen) days.    predniSONE (DELTASONE) 10 MG tablet Take 4 tablets by mouth x3 days, then 3 tablets x3 days, then 2 tablets x3 days, then 1 tablet x3 days    SPIRIVA WITH HANDIHALER 18 mcg inhalation capsule 1 inhalation once daily   Last reviewed on 10/29/2021  4:52 PM by David Christensen, Kirsten    Review of patient's allergies indicates:   Allergen Reactions    Nicole-seltzer [aspirin-sod bicarb-citric acid]     Diphenhydramine hcl      ASTHMA    Last reviewed on  2/14/2022 10:54 AM by Carine Nielsen      Tasks added this encounter   4/1/2022 - Refill Call (Auto Added)  4/7/2022 - Refill Call (Auto Added)   Tasks due within next 3 months   3/26/2022 - Refill Call (Auto Added)  4/1/2022 - Refill Call (Auto Added)     David Christensen, PharmD  [facility] - Specialty Pharmacy  [address] [place] [id number]  Phone: [phone]  Fax: [phone]

## 2022-04-01 ENCOUNTER — SPECIALTY PHARMACY (OUTPATIENT)
Dept: PHARMACY | Facility: CLINIC | Age: 32
End: 2022-04-01
Payer: MEDICAID

## 2022-04-01 NOTE — TELEPHONE ENCOUNTER
Called patient regarding Xolair and Dupixent refill. Informed patient that the script  for his Xolair and a request was sent over to his provider. Patient due to inject . Patient would like to have his rx filled on or by .

## 2022-04-04 NOTE — TELEPHONE ENCOUNTER
Xolair refills received. Dose appropriate. No lab monitoring required. No PA required. Releasing Rx to [facility].    LVM to schedule refill. Patient has an appt today @ 1pm for the injection. Will likely need to reschedule.

## 2022-04-06 ENCOUNTER — SPECIALTY PHARMACY (OUTPATIENT)
Dept: PHARMACY | Facility: CLINIC | Age: 32
End: 2022-04-06
Payer: MEDICAID

## 2022-04-06 NOTE — TELEPHONE ENCOUNTER
Specialty Pharmacy - Refill Coordination    Specialty Medication Orders Linked to Encounter    Flowsheet Row Most Recent Value   Medication #1 dupilumab (DUPIXENT PEN) 300 mg/2 mL PnIj (Order#[id number], Rx#[id number])          Refill Questions - Documented Responses    Flowsheet Row Most Recent Value   Patient Availability and HIPAA Verification    Does patient want to proceed with activity? Yes   HIPAA/medical authority confirmed? Yes   Relationship to patient of person spoken to? Self   Refill Screening Questions    Changes to allergies? No   Changes to medications? No   New conditions since last clinic visit? No   Unplanned office visit, urgent care, ED, or hospital admission in the last 4 weeks? No   How does patient/caregiver feel medication is working? Good   Financial problems or insurance changes? No   How many doses of your specialty medications were missed in the last 4 weeks? 0   Would patient like to speak to a pharmacist? No   When does the patient need to receive the medication? 04/13/22   Refill Delivery Questions    How will the patient receive the medication? Delivery [place]   When does the patient need to receive the medication? 04/13/22   Shipping Address Prescription   Address in [facility] confirmed and updated if neccessary? Yes   Expected Copay ($) 0   Is the patient able to afford the medication copay? Yes   Payment Method zero copay   Days supply of Refill 28   Supplies needed? No supplies needed   Refill activity completed? Yes   Refill activity plan Refill scheduled   Shipment/Pickup Date: 04/08/22          Current Outpatient Medications   Medication Sig    albuterol (ACCUNEB) 1.25 mg/3 mL Nebu Take 3 mLs (1.25 mg total) by nebulization every 6 (six) hours as needed (cough, wheeze). Rescue    albuterol (PROAIR HFA) 90 mcg/actuation inhaler Inhale 2 puffs into the lungs every 6 (six) hours as needed for Wheezing. Rescue    albuterol (PROVENTIL) 2.5 mg /3 mL (0.083 %) nebulizer  solution Take 3 mLs (2.5 mg total) by nebulization every 6 (six) hours as needed for Wheezing or Shortness of Breath. Rescue    dupilumab (DUPIXENT PEN) 300 mg/2 mL PnIj Inject 300 mg into the skin every 14 (fourteen) days.    famotidine (PEPCID) 20 MG tablet Take 1 tablet (20 mg total) by mouth 2 (two) times daily. Take with naproxen for 7 days    fluticasone furoate-vilanteroL (BREO ELLIPTA) 200-25 mcg/dose DsDv diskus inhaler Inhale 1 puff into the lungs once daily. Controller    fluticasone-salmeterol diskus inhaler 500-50 mcg Inhale 1 puff into the lungs 2 (two) times daily. Controller    montelukast (SINGULAIR) 10 mg tablet Take 1 tablet (10 mg total) by mouth every evening.    naproxen (NAPROSYN) 500 MG tablet Take 1 tablet (500 mg total) by mouth 2 (two) times daily with meals.    omalizumab (XOLAIR) 150 mg/mL injection Inject 2 mLs (300 mg total) into the skin every 14 (fourteen) days.    predniSONE (DELTASONE) 10 MG tablet Take 4 tablets by mouth x3 days, then 3 tablets x3 days, then 2 tablets x3 days, then 1 tablet x3 days    SPIRIVA WITH HANDIHALER 18 mcg inhalation capsule 1 inhalation once daily   Last reviewed on 10/29/2021  4:52 PM by David Christensen, Kirsten    Review of patient's allergies indicates:   Allergen Reactions    Nicole-seltzer [aspirin-sod bicarb-citric acid]     Diphenhydramine hcl      ASTHMA    Last reviewed on  4/4/2022 2:45 PM by Jessica Brown      Tasks added this encounter   5/4/2022 - Refill Call (Auto Added)  5/5/2022 - Refill Call (Auto Added)   Tasks due within next 3 months   4/1/2022 - Refill Call (Auto Added)     Maylin Han [facility] - Specialty Pharmacy  [address] [place] [id number]  Phone: [phone]  Fax: [phone]

## 2022-04-13 NOTE — TELEPHONE ENCOUNTER
Specialty Pharmacy - Refill Coordination  Specialty Pharmacy - Medication/Referral Authorization    Specialty Medication Orders Linked to Encounter    Flowsheet Row Most Recent Value   Medication #1 omalizumab (XOLAIR) 150 mg/mL injection (Order#[id number], Rx#)   Medication #2 dupilumab (DUPIXENT PEN) 300 mg/2 mL PnIj (Order#[id number], Rx#[id number])          Refill Questions - Documented Responses    Flowsheet Row Most Recent Value   Patient Availability and HIPAA Verification    Does patient want to proceed with activity? Yes   HIPAA/medical authority confirmed? Yes   Relationship to patient of person spoken to? Self   Refill Screening Questions    Changes to allergies? No   Changes to medications? No   New conditions since last clinic visit? No   Unplanned office visit, urgent care, ED, or hospital admission in the last 4 weeks? No   How does patient/caregiver feel medication is working? Good   Financial problems or insurance changes? No   How many doses of your specialty medications were missed in the last 4 weeks? 0   Would patient like to speak to a pharmacist? No   When does the patient need to receive the medication? 04/18/22   Refill Delivery Questions    How will the patient receive the medication?    When does the patient need to receive the medication? 04/18/22   Shipping Address Prescription   Address in [facility] confirmed and updated if neccessary? Yes   Expected Copay ($) 0   Is the patient able to afford the medication copay? Yes   Payment Method zero copay   Days supply of Refill 28   Supplies needed? No supplies needed   Refill activity completed? Yes   Refill activity plan Refill scheduled   Shipment/Pickup Date: 04/14/22          Current Outpatient Medications   Medication Sig    albuterol (ACCUNEB) 1.25 mg/3 mL Nebu Take 3 mLs (1.25 mg total) by nebulization every 6 (six) hours as needed (cough, wheeze). Rescue    albuterol (PROAIR HFA) 90 mcg/actuation inhaler Inhale 2 puffs  into the lungs every 6 (six) hours as needed for Wheezing. Rescue    albuterol (PROVENTIL) 2.5 mg /3 mL (0.083 %) nebulizer solution Take 3 mLs (2.5 mg total) by nebulization every 6 (six) hours as needed for Wheezing or Shortness of Breath. Rescue    dupilumab (DUPIXENT PEN) 300 mg/2 mL PnIj Inject 300 mg into the skin every 14 (fourteen) days.    famotidine (PEPCID) 20 MG tablet Take 1 tablet (20 mg total) by mouth 2 (two) times daily. Take with naproxen for 7 days    fluticasone furoate-vilanteroL (BREO ELLIPTA) 200-25 mcg/dose DsDv diskus inhaler Inhale 1 puff into the lungs once daily. Controller    fluticasone-salmeterol diskus inhaler 500-50 mcg Inhale 1 puff into the lungs 2 (two) times daily. Controller    montelukast (SINGULAIR) 10 mg tablet Take 1 tablet (10 mg total) by mouth every evening.    naproxen (NAPROSYN) 500 MG tablet Take 1 tablet (500 mg total) by mouth 2 (two) times daily with meals.    omalizumab (XOLAIR) 150 mg/mL injection Inject 2 mLs (300 mg total) into the skin every 14 (fourteen) days.    predniSONE (DELTASONE) 10 MG tablet Take 4 tablets by mouth x3 days, then 3 tablets x3 days, then 2 tablets x3 days, then 1 tablet x3 days    SPIRIVA WITH HANDIHALER 18 mcg inhalation capsule 1 inhalation once daily   Last reviewed on 10/29/2021  4:52 PM by David Christensen, PharmD    Review of patient's allergies indicates:   Allergen Reactions    Nicole-seltzer [aspirin-sod bicarb-citric acid]     Diphenhydramine hcl      ASTHMA    Last reviewed on  4/4/2022 2:45 PM by Jessica Brown      Tasks added this encounter   5/9/2022 - Refill Call (Auto Added)  5/11/2022 - Refill Call (Auto Added)  5/12/2022 - Refill Call (Auto Added)   Tasks due within next 3 months   5/4/2022 - Refill Call (Auto Added)  5/5/2022 - Refill Call (Auto Added)     Ana Matos, PharmD  [facility] - Specialty Pharmacy  [address] [facility] [address]  Phone: [phone]  Fax: [phone]

## 2022-04-13 NOTE — TELEPHONE ENCOUNTER
Lokesh confirmed with Gregg Canada via teams  Attn: Gregg Canada  [address] [facility]  Inpatient Pharmacy (2nd floor)  [place] [id number]

## 2022-04-18 ENCOUNTER — PATIENT MESSAGE (OUTPATIENT)
Dept: ADMINISTRATIVE | Facility: OTHER | Age: 32
End: 2022-04-18
Payer: MEDICAID

## 2022-05-04 ENCOUNTER — SPECIALTY PHARMACY (OUTPATIENT)
Dept: PHARMACY | Facility: CLINIC | Age: 32
End: 2022-05-04
Payer: MEDICAID

## 2022-05-04 NOTE — TELEPHONE ENCOUNTER
Outgoing call regarding Dupixent refill. Spoke with pt and pt asked if he can call us back. Will follow up with pt on 5/7/22.

## 2022-05-09 NOTE — TELEPHONE ENCOUNTER
Specialty Pharmacy - Refill Coordination    Specialty Medication Orders Linked to Encounter    Flowsheet Row Most Recent Value   Medication #1 dupilumab (DUPIXENT PEN) 300 mg/2 mL PnIj (Order#[id number], Rx#[id number])   Medication #2 omalizumab (XOLAIR) 150 mg/mL injection (Order#[id number], Rx#[id number])          Refill Questions - Documented Responses    Flowsheet Row Most Recent Value   Patient Availability and HIPAA Verification    Does patient want to proceed with activity? Yes   HIPAA/medical authority confirmed? Yes   Relationship to patient of person spoken to? Self   Refill Screening Questions    Changes to allergies? No   Changes to medications? No   New conditions since last clinic visit? No   Unplanned office visit, urgent care, ED, or hospital admission in the last 4 weeks? No   How does patient/caregiver feel medication is working? Good   Financial problems or insurance changes? No   How many doses of your specialty medications were missed in the last 4 weeks? 0   Would patient like to speak to a pharmacist? No   When does the patient need to receive the medication? 05/11/22   Refill Delivery Questions    How will the patient receive the medication? Delivery [place]   When does the patient need to receive the medication? 05/11/22   Shipping Address Home   Address in [facility] confirmed and updated if neccessary? Yes   Expected Copay ($) 0   Is the patient able to afford the medication copay? Yes   Payment Method zero copay   Days supply of Refill 28   Supplies needed? No supplies needed   Refill activity completed? Yes   Refill activity plan Refill scheduled   Shipment/Pickup Date: 05/11/22          Current Outpatient Medications   Medication Sig    albuterol (ACCUNEB) 1.25 mg/3 mL Nebu Take 3 mLs (1.25 mg total) by nebulization every 6 (six) hours as needed (cough, wheeze). Rescue    albuterol (PROAIR HFA) 90 mcg/actuation inhaler Inhale 2 puffs into the lungs every 6 (six) hours as needed  for Wheezing. Rescue    dupilumab (DUPIXENT PEN) 300 mg/2 mL PnIj Inject 300 mg into the skin every 14 (fourteen) days.    famotidine (PEPCID) 20 MG tablet Take 1 tablet (20 mg total) by mouth 2 (two) times daily. Take with naproxen for 7 days    fluticasone furoate-vilanteroL (BREO ELLIPTA) 200-25 mcg/dose DsDv diskus inhaler Inhale 1 puff into the lungs once daily. Controller    fluticasone-salmeterol diskus inhaler 500-50 mcg Inhale 1 puff into the lungs 2 (two) times daily. Controller    montelukast (SINGULAIR) 10 mg tablet Take 1 tablet (10 mg total) by mouth every evening.    naproxen (NAPROSYN) 500 MG tablet Take 1 tablet (500 mg total) by mouth 2 (two) times daily with meals.    omalizumab (XOLAIR) 150 mg/mL injection Inject 2 mLs (300 mg total) into the skin every 14 (fourteen) days.    predniSONE (DELTASONE) 10 MG tablet Take 4 tablets by mouth x3 days, then 3 tablets x3 days, then 2 tablets x3 days, then 1 tablet x3 days    SPIRIVA WITH HANDIHALER 18 mcg inhalation capsule 1 inhalation once daily   Last reviewed on 10/29/2021  4:52 PM by David Christensen, PharmHUMBERTO    Review of patient's allergies indicates:   Allergen Reactions    Nicole-seltzer [aspirin-sod bicarb-citric acid]     Diphenhydramine hcl      ASTHMA    Last reviewed on  5/3/2022 10:33 AM by Edith Bobo      Tasks added this encounter   6/1/2022 - Refill Call (Auto Added)  6/7/2022 - Refill Call (Auto Added)   Tasks due within next 3 months   No tasks due.     [facility] - Specialty Pharmacy  [address] [place] [id number]  Phone: [phone]  Fax: [phone]

## 2022-06-07 ENCOUNTER — SPECIALTY PHARMACY (OUTPATIENT)
Dept: PHARMACY | Facility: CLINIC | Age: 32
End: 2022-06-07
Payer: MEDICAID

## 2022-06-07 NOTE — TELEPHONE ENCOUNTER
Specialty Pharmacy - Refill Coordination    Specialty Medication Orders Linked to Encounter    Flowsheet Row Most Recent Value   Medication #1 omalizumab (XOLAIR) 150 mg/mL injection (Order#[id number], Rx#[id number])   Medication #2 dupilumab (DUPIXENT PEN) 300 mg/2 mL PnIj (Order#[id number], Rx#[id number])        Patient had an asthma exacerbation on 5/25 that led to an ER visit. Overall patient feels his current medications work, but he lost his rescue inhaler recently. He inquired about how to get a replacement. Advised he call his local [employer] and see if the claim will pay. If not, ask them to call his insurance to request lost medication override. Patient verbalized understanding. No further questions or concerns.     Refill Questions - Documented Responses    Flowsheet Row Most Recent Value   Patient Availability and HIPAA Verification    Does patient want to proceed with activity? Yes   HIPAA/medical authority confirmed? Yes   Relationship to patient of person spoken to? Self   Refill Screening Questions    Changes to allergies? No   Changes to medications? No   New conditions since last clinic visit? No   Unplanned office visit, urgent care, ED, or hospital admission in the last 4 weeks? Yes  [asthma exacerbation - overall patient feels his current medications work, but he lost his rescue inhaler]   How does patient/caregiver feel medication is working? Good   Financial problems or insurance changes? No   How many doses of your specialty medications were missed in the last 4 weeks? 0   Would patient like to speak to a pharmacist? No   When does the patient need to receive the medication? 06/14/22   Refill Delivery Questions    How will the patient receive the medication? Delivery [place]   When does the patient need to receive the medication? 06/14/22   Shipping Address Home   Address in [facility] confirmed and updated if neccessary? Yes   Expected Copay ($) 0   Is the patient able to afford the  medication copay? Yes   Payment Method zero copay   Days supply of Refill 28   Supplies needed? Injection Device   Refill activity completed? Yes   Refill activity plan Refill scheduled   Shipment/Pickup Date: 06/10/22          Current Outpatient Medications   Medication Sig    albuterol (ACCUNEB) 1.25 mg/3 mL Nebu Take 3 mLs (1.25 mg total) by nebulization every 6 (six) hours as needed (cough, wheeze). Rescue    albuterol (PROAIR HFA) 90 mcg/actuation inhaler Inhale 2 puffs into the lungs every 6 (six) hours as needed for Wheezing. Rescue    dupilumab (DUPIXENT PEN) 300 mg/2 mL PnIj Inject 300 mg into the skin every 14 (fourteen) days.    famotidine (PEPCID) 20 MG tablet Take 1 tablet (20 mg total) by mouth 2 (two) times daily. Take with naproxen for 7 days    fluticasone furoate-vilanteroL (BREO ELLIPTA) 200-25 mcg/dose DsDv diskus inhaler Inhale 1 puff into the lungs once daily. Controller    fluticasone-salmeterol diskus inhaler 500-50 mcg Inhale 1 puff into the lungs 2 (two) times daily. Controller    montelukast (SINGULAIR) 10 mg tablet Take 1 tablet (10 mg total) by mouth every evening.    naproxen (NAPROSYN) 500 MG tablet Take 1 tablet (500 mg total) by mouth 2 (two) times daily with meals.    omalizumab (XOLAIR) 150 mg/mL injection Inject 2 mLs (300 mg total) into the skin every 14 (fourteen) days.    SPIRIVA WITH HANDIHALER 18 mcg inhalation capsule 1 inhalation once daily   Last reviewed on 5/25/2022  2:27 AM by Ary Hill, RN    Review of patient's allergies indicates:   Allergen Reactions    Nicole-seltzer [aspirin-sod bicarb-citric acid]     Diphenhydramine hcl      ASTHMA    Last reviewed on  5/31/2022 10:17 AM by Britany Huggins      Tasks added this encounter   6/30/2022 - Refill Call (Auto Added)  6/30/2022 - Refill Call (Auto Added)   Tasks due within next 3 months   No tasks due.     David Christensen, PharmD  [facility] - [facility]ty [facility]  [address] [facility]  [place] [address]  Phone: [phone]  Fax: [phone]

## 2022-07-01 ENCOUNTER — SPECIALTY PHARMACY (OUTPATIENT)
Dept: PHARMACY | Facility: CLINIC | Age: 32
End: 2022-07-01
Payer: MEDICAID

## 2022-07-01 NOTE — TELEPHONE ENCOUNTER
Specialty Pharmacy - Refill Coordination    Specialty Medication Orders Linked to Encounter    Flowsheet Row Most Recent Value   Medication #1 dupilumab (DUPIXENT PEN) 300 mg/2 mL PnIj (Order#[id number], Rx#[id number])   Medication #2 omalizumab (XOLAIR) 150 mg/mL injection (Order#[id number], Rx#[id number])          Refill Questions - Documented Responses    Flowsheet Row Most Recent Value   Patient Availability and HIPAA Verification    Does patient want to proceed with activity? Yes   HIPAA/medical authority confirmed? Yes   Relationship to patient of person spoken to? Self   Refill Screening Questions    Changes to allergies? No   Changes to medications? No   New conditions since last clinic visit? No   Unplanned office visit, urgent care, ED, or hospital admission in the last 4 weeks? No   How does patient/caregiver feel medication is working? Good   Financial problems or insurance changes? No   How many doses of your specialty medications were missed in the last 4 weeks? 0   Would patient like to speak to a pharmacist? No   When does the patient need to receive the medication? 07/11/22   Refill Delivery Questions    How will the patient receive the medication?    When does the patient need to receive the medication? 07/11/22   Shipping Address Home   Address in [facility] confirmed and updated if neccessary? Yes   Expected Copay ($) 0   Is the patient able to afford the medication copay? Yes   Payment Method zero copay   Days supply of Refill 28   Supplies needed? No supplies needed   Refill activity completed? Yes   Refill activity plan Refill scheduled   Shipment/Pickup Date: 07/06/22          Current Outpatient Medications   Medication Sig    albuterol (ACCUNEB) 1.25 mg/3 mL Nebu Take 3 mLs (1.25 mg total) by nebulization every 6 (six) hours as needed (cough, wheeze). Rescue    albuterol (PROAIR HFA) 90 mcg/actuation inhaler Inhale 2 puffs into the lungs every 6 (six) hours as needed for  Wheezing. Rescue    dupilumab (DUPIXENT PEN) 300 mg/2 mL PnIj Inject 300 mg into the skin every 14 (fourteen) days.    famotidine (PEPCID) 20 MG tablet Take 1 tablet (20 mg total) by mouth 2 (two) times daily. Take with naproxen for 7 days    fluticasone furoate-vilanteroL (BREO ELLIPTA) 200-25 mcg/dose DsDv diskus inhaler Inhale 1 puff into the lungs once daily. Controller    fluticasone-salmeterol diskus inhaler 500-50 mcg Inhale 1 puff into the lungs 2 (two) times daily. Controller    montelukast (SINGULAIR) 10 mg tablet Take 1 tablet (10 mg total) by mouth every evening.    naproxen (NAPROSYN) 500 MG tablet Take 1 tablet (500 mg total) by mouth 2 (two) times daily with meals.    omalizumab (XOLAIR) 150 mg/mL injection Inject 2 mLs (300 mg total) into the skin every 14 (fourteen) days.    SPIRIVA WITH HANDIHALER 18 mcg inhalation capsule 1 inhalation once daily   Last reviewed on 6/29/2022  9:01 AM by Aure Christianson MA    Review of patient's allergies indicates:   Allergen Reactions    Nicole-seltzer [aspirin-sod bicarb-citric acid]     Diphenhydramine hcl      ASTHMA    Last reviewed on  6/29/2022 9:01 AM by Aure Christianson      Tasks added this encounter   7/24/2022 - Refill Call (Auto Added)  8/1/2022 - Refill Call (Auto Added)  7/24/2022 - Refill Call (Auto Added)  8/1/2022 - Refill Call (Auto Added)   Tasks due within next 3 months   No tasks due.     Trina Han [facility] - Specialty Pharmacy  [address] [facility] [address]  Phone: [phone]  Fax: [phone]

## 2022-07-25 ENCOUNTER — PATIENT MESSAGE (OUTPATIENT)
Dept: PHARMACY | Facility: CLINIC | Age: 32
End: 2022-07-25
Payer: MEDICAID

## 2022-07-25 ENCOUNTER — SPECIALTY PHARMACY (OUTPATIENT)
Dept: PHARMACY | Facility: CLINIC | Age: 32
End: 2022-07-25
Payer: MEDICAID

## 2022-07-25 NOTE — TELEPHONE ENCOUNTER
Lokesh confirmed     Attn: Gregg Canada  [address] [facility]   Inpatient Pharmacy (2nd floor)  [place] [address]

## 2022-07-25 NOTE — TELEPHONE ENCOUNTER
Specialty Pharmacy - Refill Coordination    Specialty Medication Orders Linked to Encounter    Flowsheet Row Most Recent Value   Medication #1 dupilumab (DUPIXENT PEN) 300 mg/2 mL PnIj (Order#[id number], Rx#[id number])   Medication #2 omalizumab (XOLAIR) 150 mg/mL injection (Order#[id number], Rx#[id number])      DUPIXENT refill needed by 8/3 via delivery suzy.  XOLAIR refill needed by 7/27 via  ( confirmed).        Refill Questions - Documented Responses    Flowsheet Row Most Recent Value   Patient Availability and HIPAA Verification    Does patient want to proceed with activity? Yes   HIPAA/medical authority confirmed? Yes   Relationship to patient of person spoken to? Self   Refill Screening Questions    Changes to allergies? No   Changes to medications? No   New conditions since last clinic visit? No   Unplanned office visit, urgent care, ED, or hospital admission in the last 4 weeks? No   How does patient/caregiver feel medication is working? Very good   Financial problems or insurance changes? No   How many doses of your specialty medications were missed in the last 4 weeks? 0   Would patient like to speak to a pharmacist? No   When does the patient need to receive the medication? 07/27/22   Refill Delivery Questions    How will the patient receive the medication? Delivery [place]   When does the patient need to receive the medication? 07/27/22   Shipping Address Temporary   Address in [facility] confirmed and updated if neccessary? Yes   Expected Copay ($) 0   Is the patient able to afford the medication copay? Yes   Payment Method zero copay   Days supply of Refill 28   Supplies needed? No supplies needed   Refill activity completed? Yes   Refill activity plan Refill scheduled   Shipment/Pickup Date: 07/26/22          Current Outpatient Medications   Medication Sig    albuterol (ACCUNEB) 1.25 mg/3 mL Nebu Take 3 mLs (1.25 mg total) by nebulization every 6 (six) hours as needed (cough,  wheeze). Rescue    albuterol (PROAIR HFA) 90 mcg/actuation inhaler Inhale 2 puffs into the lungs every 6 (six) hours as needed for Wheezing. Rescue    dupilumab (DUPIXENT PEN) 300 mg/2 mL PnIj Inject 300 mg into the skin every 14 (fourteen) days.    famotidine (PEPCID) 20 MG tablet Take 1 tablet (20 mg total) by mouth 2 (two) times daily. Take with naproxen for 7 days    fluticasone furoate-vilanteroL (BREO ELLIPTA) 200-25 mcg/dose DsDv diskus inhaler Inhale 1 puff into the lungs once daily. Controller    fluticasone-salmeterol diskus inhaler 500-50 mcg Inhale 1 puff into the lungs 2 (two) times daily. Controller    montelukast (SINGULAIR) 10 mg tablet Take 1 tablet (10 mg total) by mouth every evening.    naproxen (NAPROSYN) 500 MG tablet Take 1 tablet (500 mg total) by mouth 2 (two) times daily with meals.    omalizumab (XOLAIR) 150 mg/mL injection Inject 2 mLs (300 mg total) into the skin every 14 (fourteen) days.    SPIRIVA WITH HANDIHALER 18 mcg inhalation capsule 1 inhalation once daily   Last reviewed on 6/29/2022  9:01 AM by Aure Christianson MA    Review of patient's allergies indicates:   Allergen Reactions    Nicole-seltzer [aspirin-sod bicarb-citric acid]     Diphenhydramine hcl      ASTHMA    Last reviewed on  7/13/2022 12:56 PM by Edith Bobo      Tasks added this encounter   8/17/2022 - Refill Call (Auto Added)  8/25/2022 - Refill Call (Auto Added)  8/17/2022 - Refill Call (Auto Added)  8/25/2022 - Refill Call (Auto Added)   Tasks due within next 3 months   No tasks due.     Anabella Weiner, PharmD  [facility]  [address] [place] [address]  Phone: [phone]  Fax: [phone]

## 2022-08-17 ENCOUNTER — SPECIALTY PHARMACY (OUTPATIENT)
Dept: PHARMACY | Facility: CLINIC | Age: 32
End: 2022-08-17
Payer: MEDICAID

## 2022-08-17 NOTE — TELEPHONE ENCOUNTER
One dose of Xolair onhand at [facility]. Patient missed one appointment in June but otherwise adherent to therapy. Pending refill call appropriately.

## 2022-08-25 ENCOUNTER — SPECIALTY PHARMACY (OUTPATIENT)
Dept: PHARMACY | Facility: CLINIC | Age: 32
End: 2022-08-25
Payer: MEDICAID

## 2022-08-25 NOTE — TELEPHONE ENCOUNTER
Specialty Pharmacy - Refill Coordination    Specialty Medication Orders Linked to Encounter    Flowsheet Row Most Recent Value   Medication #1 dupilumab (DUPIXENT PEN) 300 mg/2 mL PnIj (Order#[id number], Rx#[id number])          Refill Questions - Documented Responses    Flowsheet Row Most Recent Value   Patient Availability and HIPAA Verification    Does patient want to proceed with activity? Yes   HIPAA/medical authority confirmed? Yes   Relationship to patient of person spoken to? Self   Refill Screening Questions    Changes to allergies? No   Changes to medications? No   New conditions since last clinic visit? No   Unplanned office visit, urgent care, ED, or hospital admission in the last 4 weeks? No   How does patient/caregiver feel medication is working? Very good   Financial problems or insurance changes? No   How many doses of your specialty medications were missed in the last 4 weeks? 0   Would patient like to speak to a pharmacist? No   When does the patient need to receive the medication? 08/31/22   Refill Delivery Questions    How will the patient receive the medication? Delivery [place]   When does the patient need to receive the medication? 08/31/22   Shipping Address Home   Address in [facility] confirmed and updated if neccessary? Yes   Expected Copay ($) 0   Is the patient able to afford the medication copay? Yes   Payment Method zero copay   Days supply of Refill 28   Supplies needed? No supplies needed   Refill activity completed? Yes   Refill activity plan Refill scheduled   Shipment/Pickup Date: 08/29/22          Current Outpatient Medications   Medication Sig    albuterol (ACCUNEB) 1.25 mg/3 mL Nebu Take 3 mLs (1.25 mg total) by nebulization every 6 (six) hours as needed (cough, wheeze). Rescue    albuterol (PROAIR HFA) 90 mcg/actuation inhaler Inhale 2 puffs into the lungs every 6 (six) hours as needed for Wheezing. Rescue    dupilumab (DUPIXENT PEN) 300 mg/2 mL PnIj Inject 300 mg into  the skin every 14 (fourteen) days.    famotidine (PEPCID) 20 MG tablet Take 1 tablet (20 mg total) by mouth 2 (two) times daily. Take with naproxen for 7 days    fluticasone furoate-vilanteroL (BREO ELLIPTA) 200-25 mcg/dose DsDv diskus inhaler Inhale 1 puff into the lungs once daily. Controller    fluticasone-salmeterol diskus inhaler 500-50 mcg Inhale 1 puff into the lungs 2 (two) times daily. Controller    montelukast (SINGULAIR) 10 mg tablet Take 1 tablet (10 mg total) by mouth every evening.    naproxen (NAPROSYN) 500 MG tablet Take 1 tablet (500 mg total) by mouth 2 (two) times daily with meals.    omalizumab (XOLAIR) 150 mg/mL injection Inject 2 mLs (300 mg total) into the skin every 14 (fourteen) days.    SPIRIVA WITH HANDIHALER 18 mcg inhalation capsule 1 inhalation once daily   Last reviewed on 6/29/2022  9:01 AM by Aure Christianson MA    Review of patient's allergies indicates:   Allergen Reactions    Nicole-seltzer [aspirin-sod bicarb-citric acid]     Diphenhydramine hcl      ASTHMA    Last reviewed on  8/23/2022 10:42 AM by Jessica Brown      Tasks added this encounter   9/17/2022 - Refill Call (Auto Added)   Tasks due within next 3 months   No tasks due.     [facility] - Specialty Pharmacy  [address] [facility] [address]  Phone: [phone]  Fax: [phone]

## 2022-08-31 ENCOUNTER — SPECIALTY PHARMACY (OUTPATIENT)
Dept: PHARMACY | Facility: CLINIC | Age: 32
End: 2022-08-31
Payer: MEDICAID

## 2022-08-31 DIAGNOSIS — J45.50 SEVERE PERSISTENT ASTHMA, UNSPECIFIED WHETHER COMPLICATED: Primary | ICD-10-CM

## 2022-08-31 NOTE — TELEPHONE ENCOUNTER
Specialty Pharmacy - Refill Coordination    Specialty Medication Orders Linked to Encounter      Flowsheet Row Most Recent Value   Medication #1 omalizumab (XOLAIR) 150 mg/mL injection (Order#[id number], Rx#[id number])            Refill Questions - Documented Responses      Flowsheet Row Most Recent Value   Patient Availability and HIPAA Verification    Does patient want to proceed with activity? Yes   HIPAA/medical authority confirmed? Yes   Relationship to patient of person spoken to? Self   Refill Screening Questions    Changes to allergies? No   Changes to medications? No   New conditions since last clinic visit? No   Unplanned office visit, urgent care, ED, or hospital admission in the last 4 weeks? No   How does patient/caregiver feel medication is working? Good   Financial problems or insurance changes? No   How many doses of your specialty medications were missed in the last 4 weeks? 0   Would patient like to speak to a pharmacist? No   When does the patient need to receive the medication? 09/06/22   Refill Delivery Questions    How will the patient receive the medication?    When does the patient need to receive the medication? 09/06/22   Shipping Address Home   Address in [facility] confirmed and updated if neccessary? Yes   Expected Copay ($) 0   Is the patient able to afford the medication copay? Yes   Payment Method zero copay   Days supply of Refill 28   Supplies needed? No supplies needed   Refill activity completed? Yes   Refill activity plan Refill scheduled   Shipment/Pickup Date: 09/01/22            Current Outpatient Medications   Medication Sig    albuterol (ACCUNEB) 1.25 mg/3 mL Nebu Take 3 mLs (1.25 mg total) by nebulization every 6 (six) hours as needed (cough, wheeze). Rescue    albuterol (PROAIR HFA) 90 mcg/actuation inhaler Inhale 2 puffs into the lungs every 6 (six) hours as needed for Wheezing. Rescue    dupilumab (DUPIXENT PEN) 300 mg/2 mL PnIj Inject 300 mg into the skin  every 14 (fourteen) days.    famotidine (PEPCID) 20 MG tablet Take 1 tablet (20 mg total) by mouth 2 (two) times daily. Take with naproxen for 7 days    fluticasone furoate-vilanteroL (BREO ELLIPTA) 200-25 mcg/dose DsDv diskus inhaler Inhale 1 puff into the lungs once daily. Controller    fluticasone-salmeterol diskus inhaler 500-50 mcg Inhale 1 puff into the lungs 2 (two) times daily. Controller    montelukast (SINGULAIR) 10 mg tablet Take 1 tablet (10 mg total) by mouth every evening.    naproxen (NAPROSYN) 500 MG tablet Take 1 tablet (500 mg total) by mouth 2 (two) times daily with meals.    omalizumab (XOLAIR) 150 mg/mL injection Inject 2 mLs (300 mg total) into the skin every 14 (fourteen) days.    SPIRIVA WITH HANDIHALER 18 mcg inhalation capsule 1 inhalation once daily   Last reviewed on 6/29/2022  9:01 AM by Aure Christianson MA    Review of patient's allergies indicates:   Allergen Reactions    Nicole-seltzer [aspirin-sod bicarb-citric acid]     Diphenhydramine hcl      ASTHMA    Last reviewed on  8/23/2022 10:42 AM by Jessica Brown      Tasks added this encounter   9/27/2022 - Refill Call (Auto Added)  8/31/2022 -  Setup Confirmation   Tasks due within next 3 months   9/17/2022 - Refill Call (Auto Added)     Margaret Godfrey-Laly Han [facility] - Specialty Pharmacy  [address] [place] [id number]  Phone: [phone]  Fax: [phone]

## 2022-09-22 ENCOUNTER — SPECIALTY PHARMACY (OUTPATIENT)
Dept: PHARMACY | Facility: CLINIC | Age: 32
End: 2022-09-22
Payer: MEDICAID

## 2022-09-22 DIAGNOSIS — J45.50 SEVERE PERSISTENT ASTHMA, UNSPECIFIED WHETHER COMPLICATED: Primary | ICD-10-CM

## 2022-09-22 NOTE — TELEPHONE ENCOUNTER
Patient was a no show at his 9/20 appointment. Confirmed with Gregg [facility] they have 1 dose (300mg) on hand for him. Appointment scheduled for 9/27. Next need by date is 10/11. Pending refill accordingly.

## 2022-10-04 ENCOUNTER — SPECIALTY PHARMACY (OUTPATIENT)
Dept: PHARMACY | Facility: CLINIC | Age: 32
End: 2022-10-04
Payer: MEDICAID
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
## 2022-10-07 ENCOUNTER — SPECIALTY PHARMACY (OUTPATIENT)
Dept: PHARMACY | Facility: CLINIC | Age: 32
End: 2022-10-07
Payer: MEDICAID

## 2022-10-07 NOTE — TELEPHONE ENCOUNTER
Spoke w pt regarding Xolair refill. Informed pt that refill request was denied. Pt stated he will reach out to provider. Pt is due 10/11.

## 2022-10-10 NOTE — TELEPHONE ENCOUNTER
Per Margarita Mooney NP, Xolair treatment is discontinued. He will continue on Dupixent only. Closing Xolair enrollment at [facility].

## 2022-10-10 NOTE — TELEPHONE ENCOUNTER
"Xolair Rx from 10/7 is in "print" status. Re-faxed refill request to [facility]. Urgent secure chat and InBasket sent to provider. Patient's appointment is scheduled for tomorrow at 10am.   "

## 2022-10-27 ENCOUNTER — SPECIALTY PHARMACY (OUTPATIENT)
Dept: PHARMACY | Facility: CLINIC | Age: 32
End: 2022-10-27
Payer: MEDICAID

## 2022-10-27 NOTE — TELEPHONE ENCOUNTER
Outgoing call regarding dupixent refill; per pt, he injects tomorrow and has a pen on hand; informed him that [facility] will follow up with him on 11/4 to schedule delivery

## 2022-10-28 NOTE — TELEPHONE ENCOUNTER
Dupixent PA approved 10/27/22 to 4/27/23  Request Reference Number: [id number]    Test claim shows a $0 copay - No BI required

## 2022-11-01 ENCOUNTER — SPECIALTY PHARMACY (OUTPATIENT)
Dept: PHARMACY | Facility: CLINIC | Age: 32
End: 2022-11-01
Payer: MEDICAID

## 2022-11-01 NOTE — TELEPHONE ENCOUNTER
Specialty Pharmacy - Refill Coordination    Specialty Medication Orders Linked to Encounter      Flowsheet Row Most Recent Value   Medication #1 dupilumab (DUPIXENT PEN) 300 mg/2 mL PnIj (Order#[id number], Rx#[id number])            Refill Questions - Documented Responses      Flowsheet Row Most Recent Value   Patient Availability and HIPAA Verification    Does patient want to proceed with activity? Yes   HIPAA/medical authority confirmed? Yes   Relationship to patient of person spoken to? Self   Refill Screening Questions    Changes to allergies? No   Changes to medications? No   New conditions since last clinic visit? No   Unplanned office visit, urgent care, ED, or hospital admission in the last 4 weeks? No   How does patient/caregiver feel medication is working? Good   Financial problems or insurance changes? No   How many doses of your specialty medications were missed in the last 4 weeks? 0   Would patient like to speak to a pharmacist? No   When does the patient need to receive the medication? 11/11/22   Refill Delivery Questions    How will the patient receive the medication? MEDRx   When does the patient need to receive the medication? 11/11/22   Shipping Address Prescription   Address in [facility] confirmed and updated if neccessary? Yes   Expected Copay ($) 0   Is the patient able to afford the medication copay? Yes   Days supply of Refill 28   Supplies needed? No supplies needed   Refill activity completed? Yes   Refill activity plan Refill scheduled   Shipment/Pickup Date: 11/07/22            Current Outpatient Medications   Medication Sig    albuterol (ACCUNEB) 1.25 mg/3 mL Nebu Take 3 mLs (1.25 mg total) by nebulization every 6 (six) hours as needed (cough, wheeze). Rescue    albuterol (PROAIR HFA) 90 mcg/actuation inhaler Inhale 2 puffs into the lungs every 6 (six) hours as needed for Wheezing. Rescue    dupilumab (DUPIXENT PEN) 300 mg/2 mL PnIj Inject 300 mg into the skin every 14 (fourteen)  days.    famotidine (PEPCID) 20 MG tablet Take 1 tablet (20 mg total) by mouth 2 (two) times daily. Take with naproxen for 7 days    fluticasone-umeclidin-vilanter (TRELEGY ELLIPTA) 200-62.5-25 mcg inhaler Inhale 1 puff into the lungs once daily.    methocarbamoL (ROBAXIN) 750 MG Tab Take 2 tablets (1,500 mg total) by mouth 3 (three) times daily. (muscle relaxer)    montelukast (SINGULAIR) 10 mg tablet Take 1 tablet (10 mg total) by mouth every evening.    naproxen (NAPROSYN) 500 MG tablet Take 1 tablet (500 mg total) by mouth 2 (two) times daily. (pain/inflammation)    omalizumab (XOLAIR) 150 mg/mL injection Inject 2 mLs (300 mg total) into the skin every 14 (fourteen) days.    predniSONE (DELTASONE) 20 MG tablet Take 3 tablets and if symptoms do not improve GO TO ER   Last reviewed on 10/25/2022 11:09 AM by Maddy Aquino MA    Review of patient's allergies indicates:   Allergen Reactions    Nicloe-seltzer [aspirin-sod bicarb-citric acid]     Diphenhydramine hcl      ASTHMA    Last reviewed on  10/25/2022 11:09 AM by Maddy Aquino      Tasks added this encounter   12/2/2022 - Refill Call (Auto Added)   Tasks due within next 3 months   No tasks due.     Jenifer Han [facility] - Specialty Pharmacy  [address] [place] [id number]  Phone: [phone]  Fax: [phone]

## 2022-12-02 ENCOUNTER — SPECIALTY PHARMACY (OUTPATIENT)
Dept: PHARMACY | Facility: CLINIC | Age: 32
End: 2022-12-02
Payer: MEDICAID

## 2022-12-02 NOTE — TELEPHONE ENCOUNTER
Outgoing call regarding dupixent refill; per pt, he's due to inject on 12/15; informed him that [facility] will follow up on 12/8 to schedule delivery

## 2022-12-07 NOTE — TELEPHONE ENCOUNTER
Specialty Pharmacy - Refill Coordination    Specialty Medication Orders Linked to Encounter      Flowsheet Row Most Recent Value   Medication #1 dupilumab (DUPIXENT PEN) 300 mg/2 mL PnIj (Order#[id number], Rx#[id number])            Refill Questions - Documented Responses      Flowsheet Row Most Recent Value   Patient Availability and HIPAA Verification    Does patient want to proceed with activity? Yes   HIPAA/medical authority confirmed? Yes   Relationship to patient of person spoken to? Self   Refill Screening Questions    Changes to allergies? No   Changes to medications? No   New conditions since last clinic visit? No   Unplanned office visit, urgent care, ED, or hospital admission in the last 4 weeks? No   How does patient/caregiver feel medication is working? Good   Financial problems or insurance changes? No   How many doses of your specialty medications were missed in the last 4 weeks? 0   Would patient like to speak to a pharmacist? No   When does the patient need to receive the medication? 12/13/22   Refill Delivery Questions    How will the patient receive the medication? MEDRx   When does the patient need to receive the medication? 12/13/22   Shipping Address Home   Address in [facility] confirmed and updated if neccessary? Yes   Expected Copay ($) 0   Is the patient able to afford the medication copay? Yes   Payment Method zero copay   Days supply of Refill 28   Supplies needed? No supplies needed   Refill activity completed? Yes   Refill activity plan Refill scheduled   Shipment/Pickup Date: 12/12/22            Current Outpatient Medications   Medication Sig    albuterol (ACCUNEB) 1.25 mg/3 mL Nebu Take 3 mLs (1.25 mg total) by nebulization every 6 (six) hours as needed (cough, wheeze). Rescue    albuterol (PROAIR HFA) 90 mcg/actuation inhaler Inhale 2 puffs into the lungs every 6 (six) hours as needed for Wheezing. Rescue    dupilumab (DUPIXENT PEN) 300 mg/2 mL PnIj Inject 300 mg into the skin  every 14 (fourteen) days.    famotidine (PEPCID) 20 MG tablet Take 1 tablet (20 mg total) by mouth 2 (two) times daily. Take with naproxen for 7 days    fluticasone-umeclidin-vilanter (TRELEGY ELLIPTA) 200-62.5-25 mcg inhaler Inhale 1 puff into the lungs once daily.    methocarbamoL (ROBAXIN) 750 MG Tab Take 2 tablets (1,500 mg total) by mouth 3 (three) times daily. (muscle relaxer)    montelukast (SINGULAIR) 10 mg tablet Take 1 tablet (10 mg total) by mouth every evening.    naproxen (NAPROSYN) 500 MG tablet Take 1 tablet (500 mg total) by mouth 2 (two) times daily. (pain/inflammation)    omalizumab (XOLAIR) 150 mg/mL injection Inject 2 mLs (300 mg total) into the skin every 14 (fourteen) days.    predniSONE (DELTASONE) 20 MG tablet Take 3 tablets and if symptoms do not improve GO TO ER   Last reviewed on 10/25/2022 11:09 AM by Maddy Aquino MA    Review of patient's allergies indicates:   Allergen Reactions    Nicole-seltzer [aspirin-sod bicarb-citric acid]     Diphenhydramine hcl      ASTHMA    Last reviewed on  10/25/2022 11:09 AM by Maddy Aquino      Tasks added this encounter   1/3/2023 - Refill Call (Auto Added)   Tasks due within next 3 months   No tasks due.     Margaret Han [facility] - Specialty Pharmacy  [address] [place] [id number]  Phone: [phone]  Fax: [phone]

## 2023-01-03 ENCOUNTER — PATIENT MESSAGE (OUTPATIENT)
Dept: PHARMACY | Facility: CLINIC | Age: 33
End: 2023-01-03
Payer: MEDICAID

## 2023-01-06 ENCOUNTER — SPECIALTY PHARMACY (OUTPATIENT)
Dept: PHARMACY | Facility: CLINIC | Age: 33
End: 2023-01-06
Payer: MEDICAID

## 2023-01-06 NOTE — TELEPHONE ENCOUNTER
Outgoing call: Patient next injection is 1/21. I informed him that [facility] will follow up next week to set up refill.

## 2023-01-12 NOTE — TELEPHONE ENCOUNTER
Outgoing call to patient regarding his refill. Patient reported that he was busy and would call [facility] back later.

## 2023-01-17 NOTE — TELEPHONE ENCOUNTER
Outgoing call to patient for Dupixent refill. He reports last dose on 1/16 with 1 dose on hand for the next dose on 1/30. Patient admits to getting off track with his injections and missing a couple due to the pain of the injection. He has since bought a calendar and states he has gotten over the pain. Recommended he ice the area before and after. Patient verbalized understanding. Will continue to track adherence. Patient agreed for a call back on 2/6 to schedule for dose on 2/13.

## 2023-03-10 ENCOUNTER — PATIENT MESSAGE (OUTPATIENT)
Dept: PHARMACY | Facility: CLINIC | Age: 33
End: 2023-03-10
Payer: MEDICAID

## 2023-03-14 ENCOUNTER — SPECIALTY PHARMACY (OUTPATIENT)
Dept: PHARMACY | Facility: CLINIC | Age: 33
End: 2023-03-14
Payer: MEDICAID

## 2023-03-14 NOTE — TELEPHONE ENCOUNTER
Outgoing call regarding dupixent refill; per pt, he's due to inject today and has a pen on hand; next injection is on 3/28; and pt was informed that [facility] will follow up on 3/20 to schedule delivery

## 2023-04-03 NOTE — TELEPHONE ENCOUNTER
Specialty Pharmacy - Refill Coordination    Specialty Medication Orders Linked to Encounter      Flowsheet Row Most Recent Value   Medication #1 dupilumab (DUPIXENT PEN) 300 mg/2 mL PnIj (Order#[id number], Rx#[id number])          Refill Questions - Documented Responses      Flowsheet Row Most Recent Value   Patient Availability and HIPAA Verification    Does patient want to proceed with activity? Yes   HIPAA/medical authority confirmed? Yes   Relationship to patient of person spoken to? Self   Refill Screening Questions    Changes to allergies? No   Changes to medications? No   New conditions since last clinic visit? No   Unplanned office visit, urgent care, ED, or hospital admission in the last 4 weeks? No   How does patient/caregiver feel medication is working? Good   Financial problems or insurance changes? No   How many doses of your specialty medications were missed in the last 4 weeks? 0   Would patient like to speak to a pharmacist? Yes   When does the patient need to receive the medication? 04/04/23   Refill Delivery Questions    How will the patient receive the medication? MEDRx   When does the patient need to receive the medication? 04/04/23   Shipping Address Home   Address in [facility] confirmed and updated if neccessary? Yes   Expected Copay ($) 0   Is the patient able to afford the medication copay? Yes   Payment Method zero copay   Days supply of Refill 28   Supplies needed? Injection Device   Refill activity completed? Yes   Refill activity plan Refill scheduled   Shipment/Pickup Date: 04/03/23            Current Outpatient Medications   Medication Sig    albuterol (ACCUNEB) 1.25 mg/3 mL Nebu Take 3 mLs (1.25 mg total) by nebulization every 6 (six) hours as needed (cough, wheeze). Rescue    albuterol (PROAIR HFA) 90 mcg/actuation inhaler Inhale 2 puffs into the lungs every 6 (six) hours as needed for Wheezing. Rescue    dupilumab (DUPIXENT PEN) 300 mg/2 mL PnIj Inject 300 mg into the skin  every 14 (fourteen) days.    famotidine (PEPCID) 20 MG tablet Take 1 tablet (20 mg total) by mouth 2 (two) times daily. Take with naproxen for 7 days    fluticasone furoate-vilanteroL (BREO ELLIPTA) 200-25 mcg/dose DsDv diskus inhaler Inhale 1 puff into the lungs once daily. Controller    methocarbamoL (ROBAXIN) 750 MG Tab Take 2 tablets (1,500 mg total) by mouth 3 (three) times daily. (muscle relaxer) (Patient not taking: Reported on 3/14/2023)    montelukast (SINGULAIR) 10 mg tablet Take 1 tablet (10 mg total) by mouth every evening.    naproxen (NAPROSYN) 500 MG tablet Take 1 tablet (500 mg total) by mouth 2 (two) times daily. (pain/inflammation)    omalizumab (XOLAIR) 150 mg/mL injection Inject 2 mLs (300 mg total) into the skin every 14 (fourteen) days.    predniSONE (DELTASONE) 20 MG tablet Take 3 tablets and if symptoms do not improve GO TO ER    SPIRIVA WITH HANDIHALER 18 mcg inhalation capsule     tiotropium (SPIRIVA WITH HANDIHALER) 18 mcg inhalation capsule Inhale 1 capsule (18 mcg total) into the lungs once daily. Controller   Last reviewed on 3/14/2023 10:20 AM by Coral Ovalles MA    Review of patient's allergies indicates:   Allergen Reactions    Nicole-seltzer [aspirin-sod bicarb-citric acid]     Diphenhydramine hcl      ASTHMA    Last reviewed on  3/14/2023 10:18 AM by Coral Ovalles      Tasks added this encounter   4/25/2023 - Refill Call (Auto Added)   Tasks due within next 3 months   No tasks due.     David Christensen, PharmD  [facility]  [address] [facility] [address]  Phone: [phone]  Fax: [phone]

## 2023-04-25 ENCOUNTER — SPECIALTY PHARMACY (OUTPATIENT)
Dept: PHARMACY | Facility: CLINIC | Age: 33
End: 2023-04-25
Payer: MEDICAID

## 2023-04-25 NOTE — TELEPHONE ENCOUNTER
Outgoing call to patient for Dupixent refill. He reports 1 dose on hand for Friday 4/28. Patient denies any missed doses, stating he got off track last month which is why he has an extra dose on hand. Counseled on the importance of adherence. Patient verbalized understanding and agreed for a call back next Friday 5/5 to schedule.

## 2023-05-05 NOTE — TELEPHONE ENCOUNTER
Specialty Pharmacy - Refill Coordination    Specialty Medication Orders Linked to Encounter      Flowsheet Row Most Recent Value   Medication #1 dupilumab (DUPIXENT PEN) 300 mg/2 mL PnIj (Order#[id number], Rx#[id number])          Refill Questions - Documented Responses      Flowsheet Row Most Recent Value   Patient Availability and HIPAA Verification    Does patient want to proceed with activity? Yes   HIPAA/medical authority confirmed? Yes   Relationship to patient of person spoken to? Self   Refill Screening Questions    Changes to allergies? No   Changes to medications? No   New conditions since last clinic visit? No   Unplanned office visit, urgent care, ED, or hospital admission in the last 4 weeks? No   How does patient/caregiver feel medication is working? Good   Financial problems or insurance changes? No   How many doses of your specialty medications were missed in the last 4 weeks? 0   Would patient like to speak to a pharmacist? No   When does the patient need to receive the medication? 05/12/23   Refill Delivery Questions    How will the patient receive the medication? MEDRx   When does the patient need to receive the medication? 05/12/23   Shipping Address Home   Address in [facility] confirmed and updated if neccessary? Yes   Expected Copay ($) 0   Is the patient able to afford the medication copay? Yes   Payment Method zero copay   Days supply of Refill 30   Supplies needed? No supplies needed   Refill activity completed? Yes   Refill activity plan Refill scheduled   Shipment/Pickup Date: 05/10/23            Current Outpatient Medications   Medication Sig    albuterol (ACCUNEB) 1.25 mg/3 mL Nebu Take 3 mLs (1.25 mg total) by nebulization every 6 (six) hours as needed (cough, wheeze). Rescue    albuterol (PROAIR HFA) 90 mcg/actuation inhaler Inhale 2 puffs into the lungs every 6 (six) hours as needed for Wheezing. Rescue    dupilumab (DUPIXENT PEN) 300 mg/2 mL PnIj Inject 300 mg into the skin  every 14 (fourteen) days.    famotidine (PEPCID) 20 MG tablet Take 1 tablet (20 mg total) by mouth 2 (two) times daily. Take with naproxen for 7 days    fluticasone furoate-vilanteroL (BREO ELLIPTA) 200-25 mcg/dose DsDv diskus inhaler Inhale 1 puff into the lungs once daily. Controller    methocarbamoL (ROBAXIN) 750 MG Tab Take 2 tablets (1,500 mg total) by mouth 3 (three) times daily. (muscle relaxer) (Patient not taking: Reported on 3/14/2023)    montelukast (SINGULAIR) 10 mg tablet Take 1 tablet (10 mg total) by mouth every evening.    naproxen (NAPROSYN) 500 MG tablet Take 1 tablet (500 mg total) by mouth 2 (two) times daily. (pain/inflammation)    omalizumab (XOLAIR) 150 mg/mL injection Inject 2 mLs (300 mg total) into the skin every 14 (fourteen) days.    predniSONE (DELTASONE) 20 MG tablet Take 3 tablets and if symptoms do not improve GO TO ER    SPIRIVA WITH HANDIHALER 18 mcg inhalation capsule     tiotropium (SPIRIVA WITH HANDIHALER) 18 mcg inhalation capsule Inhale 1 capsule (18 mcg total) into the lungs once daily. Controller   Last reviewed on 3/14/2023 10:20 AM by Coral Ovalles MA    Review of patient's allergies indicates:   Allergen Reactions    Nicole-seltzer [aspirin-sod bicarb-citric acid]     Diphenhydramine hcl      ASTHMA    Last reviewed on  3/14/2023 10:18 AM by Coral Ovalles      Tasks added this encounter   No tasks added.   Tasks due within next 3 months   5/5/2023 - Refill Coordination Outreach (1 time occurrence)     David Christensen, PharmD  [facility]  [address] [place] [id number]  Phone: [phone]  Fax: [phone]

## 2023-06-05 ENCOUNTER — SPECIALTY PHARMACY (OUTPATIENT)
Dept: PHARMACY | Facility: CLINIC | Age: 33
End: 2023-06-05
Payer: MEDICAID

## 2023-06-05 NOTE — TELEPHONE ENCOUNTER
Specialty Pharmacy - Refill Coordination    Specialty Medication Orders Linked to Encounter      Flowsheet Row Most Recent Value   Medication #1 dupilumab (DUPIXENT PEN) 300 mg/2 mL PnIj (Order#[id number], Rx#[id number])          Refill Questions - Documented Responses      Flowsheet Row Most Recent Value   Patient Availability and HIPAA Verification    Does patient want to proceed with activity? Yes   HIPAA/medical authority confirmed? Yes   Relationship to patient of person spoken to? Self   Refill Screening Questions    Changes to allergies? No   Changes to medications? No   New conditions since last clinic visit? No   Unplanned office visit, urgent care, ED, or hospital admission in the last 4 weeks? No   How does patient/caregiver feel medication is working? Excellent   Financial problems or insurance changes? No   How many doses of your specialty medications were missed in the last 4 weeks? 0   Would patient like to speak to a pharmacist? No   When does the patient need to receive the medication? 06/09/23   Refill Delivery Questions    How will the patient receive the medication? MEDRx   When does the patient need to receive the medication? 06/09/23   Shipping Address Home   Address in [facility] confirmed and updated if neccessary? Yes   Expected Copay ($) 0   Is the patient able to afford the medication copay? Yes   Payment Method zero copay   Days supply of Refill 28   Supplies needed? Injection Device   Refill activity completed? Yes   Refill activity plan Refill scheduled   Shipment/Pickup Date: 06/07/23            Current Outpatient Medications   Medication Sig    albuterol (ACCUNEB) 1.25 mg/3 mL Nebu Take 3 mLs (1.25 mg total) by nebulization every 6 (six) hours as needed (cough, wheeze). Rescue    albuterol (PROAIR HFA) 90 mcg/actuation inhaler Inhale 2 puffs into the lungs every 6 (six) hours as needed for Wheezing. Rescue    dupilumab (DUPIXENT PEN) 300 mg/2 mL PnIj Inject 300 mg into the skin  every 14 (fourteen) days.    famotidine (PEPCID) 20 MG tablet Take 1 tablet (20 mg total) by mouth 2 (two) times daily. Take with naproxen for 7 days    fluticasone furoate-vilanteroL (BREO ELLIPTA) 200-25 mcg/dose DsDv diskus inhaler Inhale 1 puff into the lungs once daily. Controller    methocarbamoL (ROBAXIN) 750 MG Tab Take 2 tablets (1,500 mg total) by mouth 3 (three) times daily. (muscle relaxer) (Patient not taking: Reported on 3/14/2023)    montelukast (SINGULAIR) 10 mg tablet Take 1 tablet (10 mg total) by mouth every evening.    naproxen (NAPROSYN) 500 MG tablet Take 1 tablet (500 mg total) by mouth 2 (two) times daily. (pain/inflammation)    omalizumab (XOLAIR) 150 mg/mL injection Inject 2 mLs (300 mg total) into the skin every 14 (fourteen) days.    predniSONE (DELTASONE) 20 MG tablet Take 3 tablets and if symptoms do not improve GO TO ER    SPIRIVA WITH HANDIHALER 18 mcg inhalation capsule     tiotropium (SPIRIVA WITH HANDIHALER) 18 mcg inhalation capsule Inhale 1 capsule (18 mcg total) into the lungs once daily. Controller   Last reviewed on 5/20/2023  3:13 PM by Travon Almaraz RN    Review of patient's allergies indicates:   Allergen Reactions    Nicole-seltzer [aspirin-sod bicarb-citric acid]     Diphenhydramine hcl      ASTHMA    Last reviewed on  5/20/2023 3:13 PM by Travon Almaraz      Tasks added this encounter   No tasks added.   Tasks due within next 3 months   6/3/2023 - Refill Coordination Outreach (1 time occurrence)     David Christensen, PharmD  [facility]  [address]  [facility] [address]  Phone: [phone]  Fax: [phone]

## 2023-06-28 ENCOUNTER — SPECIALTY PHARMACY (OUTPATIENT)
Dept: PHARMACY | Facility: CLINIC | Age: 33
End: 2023-06-28
Payer: MEDICAID

## 2023-06-28 NOTE — TELEPHONE ENCOUNTER
Specialty Pharmacy - Refill Coordination    Specialty Medication Orders Linked to Encounter      Flowsheet Row Most Recent Value   Medication #1 dupilumab (DUPIXENT PEN) 300 mg/2 mL PnIj (Order#[id number], Rx#[id number])            Refill Questions - Documented Responses      Flowsheet Row Most Recent Value   Patient Availability and HIPAA Verification    Does patient want to proceed with activity? Yes   HIPAA/medical authority confirmed? Yes   Relationship to patient of person spoken to? Self   Refill Screening Questions    Changes to allergies? No   Changes to medications? No   New conditions since last clinic visit? No   Unplanned office visit, urgent care, ED, or hospital admission in the last 4 weeks? No   How does patient/caregiver feel medication is working? Good   Financial problems or insurance changes? No   How many doses of your specialty medications were missed in the last 4 weeks? 0   Would patient like to speak to a pharmacist? No   When does the patient need to receive the medication? 07/07/23   Refill Delivery Questions    How will the patient receive the medication? MEDRx   When does the patient need to receive the medication? 07/07/23   Shipping Address Prescription   Address in [facility] confirmed and updated if neccessary? Yes   Expected Copay ($) 0   Is the patient able to afford the medication copay? Yes   Payment Method zero copay   Days supply of Refill 28   Supplies needed? No supplies needed   Refill activity completed? Yes   Refill activity plan Refill scheduled   Shipment/Pickup Date: 07/05/23            Current Outpatient Medications   Medication Sig    albuterol (ACCUNEB) 1.25 mg/3 mL Nebu Take 3 mLs (1.25 mg total) by nebulization every 6 (six) hours as needed (cough, wheeze). Rescue    albuterol (PROAIR HFA) 90 mcg/actuation inhaler Inhale 2 puffs into the lungs every 6 (six) hours as needed for Wheezing. Rescue    dupilumab (DUPIXENT PEN) 300 mg/2 mL PnIj Inject 300 mg into  the skin every 14 (fourteen) days.    famotidine (PEPCID) 20 MG tablet Take 1 tablet (20 mg total) by mouth 2 (two) times daily. Take with naproxen for 7 days    fluticasone furoate-vilanteroL (BREO ELLIPTA) 200-25 mcg/dose DsDv diskus inhaler Inhale 1 puff into the lungs once daily. Controller    methocarbamoL (ROBAXIN) 750 MG Tab Take 2 tablets (1,500 mg total) by mouth 3 (three) times daily. (muscle relaxer) (Patient not taking: Reported on 3/14/2023)    montelukast (SINGULAIR) 10 mg tablet Take 1 tablet (10 mg total) by mouth every evening.    naproxen (NAPROSYN) 500 MG tablet Take 1 tablet (500 mg total) by mouth 2 (two) times daily. (pain/inflammation)    omalizumab (XOLAIR) 150 mg/mL injection Inject 2 mLs (300 mg total) into the skin every 14 (fourteen) days.    predniSONE (DELTASONE) 20 MG tablet Take 3 tablets and if symptoms do not improve GO TO ER    SPIRIVA WITH HANDIHALER 18 mcg inhalation capsule     tiotropium (SPIRIVA WITH HANDIHALER) 18 mcg inhalation capsule Inhale 1 capsule (18 mcg total) into the lungs once daily. Controller   Last reviewed on 5/20/2023  3:13 PM by Travon Almaraz RN    Review of patient's allergies indicates:   Allergen Reactions    Nicole-seltzer [aspirin-sod bicarb-citric acid]     Diphenhydramine hcl      ASTHMA    Last reviewed on  5/20/2023 3:13 PM by Travon Rdz      Tasks added this encounter   No tasks added.   Tasks due within next 3 months   6/28/2023 - Refill Coordination Outreach (1 time occurrence)     [facility]  [address]  [facility] [address]  Phone: [phone]  Fax: [phone]

## 2023-07-26 ENCOUNTER — SPECIALTY PHARMACY (OUTPATIENT)
Dept: PHARMACY | Facility: CLINIC | Age: 33
End: 2023-07-26
Payer: MEDICAID

## 2023-07-26 NOTE — TELEPHONE ENCOUNTER
Outgoing call to pt regarding Dupixent refill. Pt has an injection on hand for 7/27, will need medication for 8/10. Pt reported no missed or late doses. Informed pt [facility] will callback on 8/3 for refill.

## 2023-08-07 NOTE — TELEPHONE ENCOUNTER
Specialty Pharmacy - Refill Coordination    Specialty Medication Orders Linked to Encounter      Flowsheet Row Most Recent Value   Medication #1 dupilumab (DUPIXENT PEN) 300 mg/2 mL PnIj (Order#[id number], Rx#[id number])          Refill Questions - Documented Responses      Flowsheet Row Most Recent Value   Patient Availability and HIPAA Verification    Does patient want to proceed with activity? Yes   HIPAA/medical authority confirmed? Yes   Relationship to patient of person spoken to? Self   Refill Screening Questions    Changes to allergies? No   Changes to medications? No   New conditions since last clinic visit? No   Unplanned office visit, urgent care, ED, or hospital admission in the last 4 weeks? No   How does patient/caregiver feel medication is working? Good   Financial problems or insurance changes? No   How many doses of your specialty medications were missed in the last 4 weeks? 0   Would patient like to speak to a pharmacist? No   When does the patient need to receive the medication? 08/10/23   Refill Delivery Questions    How will the patient receive the medication? MEDRx   When does the patient need to receive the medication? 08/10/23   Shipping Address Home   Address in [facility] confirmed and updated if neccessary? Yes   Expected Copay ($) 0   Is the patient able to afford the medication copay? Yes   Payment Method zero copay   Days supply of Refill 28   Supplies needed? Injection Device   Refill activity completed? Yes   Refill activity plan Refill scheduled   Shipment/Pickup Date: 08/08/23            Current Outpatient Medications   Medication Sig    albuterol (ACCUNEB) 1.25 mg/3 mL Nebu Take 3 mLs (1.25 mg total) by nebulization every 6 (six) hours as needed (cough, wheeze). Rescue    albuterol (PROAIR HFA) 90 mcg/actuation inhaler Inhale 2 puffs into the lungs every 6 (six) hours as needed for Wheezing. Rescue    dupilumab (DUPIXENT PEN) 300 mg/2 mL PnIj Inject 300 mg into the skin  every 14 (fourteen) days.    famotidine (PEPCID) 20 MG tablet Take 1 tablet (20 mg total) by mouth 2 (two) times daily. Take with naproxen for 7 days    fluticasone furoate-vilanteroL (BREO ELLIPTA) 200-25 mcg/dose DsDv diskus inhaler Inhale 1 puff into the lungs once daily. Controller    methocarbamoL (ROBAXIN) 750 MG Tab Take 2 tablets (1,500 mg total) by mouth 3 (three) times daily. (muscle relaxer) (Patient not taking: Reported on 3/14/2023)    montelukast (SINGULAIR) 10 mg tablet Take 1 tablet (10 mg total) by mouth every evening.    naproxen (NAPROSYN) 500 MG tablet Take 1 tablet (500 mg total) by mouth 2 (two) times daily. (pain/inflammation)    omalizumab (XOLAIR) 150 mg/mL injection Inject 2 mLs (300 mg total) into the skin every 14 (fourteen) days.    predniSONE (DELTASONE) 20 MG tablet Take 3 tablets and if symptoms do not improve GO TO ER    SPIRIVA WITH HANDIHALER 18 mcg inhalation capsule     tiotropium (SPIRIVA WITH HANDIHALER) 18 mcg inhalation capsule Inhale 1 capsule (18 mcg total) into the lungs once daily. Controller   Last reviewed on 5/20/2023  3:13 PM by Travon Almaraz, RN    Review of patient's allergies indicates:   Allergen Reactions    Nicole-seltzer [aspirin-sod bicarb-citric acid]     Diphenhydramine hcl      ASTHMA    Last reviewed on  5/20/2023 3:13 PM by Travon Rdz      Tasks added this encounter   No tasks added.   Tasks due within next 3 months   8/6/2023 - Refill Coordination Outreach (1 time occurrence)     David Christensen, PharmD  [facility]  [address]  [facility] [address]  Phone: [phone]  Fax: [phone]

## 2023-09-03 DIAGNOSIS — G47.33 OBSTRUCTIVE SLEEP APNEA (ADULT) (PEDIATRIC): Primary | ICD-10-CM

## 2023-09-14 ENCOUNTER — HOSPITAL ENCOUNTER (OUTPATIENT)
Dept: SLEEP MEDICINE | Facility: HOSPITAL | Age: 33
Discharge: HOME | End: 2023-09-14
Attending: NURSE PRACTITIONER
Payer: MEDICAID

## 2023-09-14 DIAGNOSIS — R29.818 SUSPECTED SLEEP APNEA: ICD-10-CM

## 2023-09-14 PROCEDURE — 95810 POLYSOM 6/> YRS 4/> PARAM: CPT

## 2023-09-15 PROBLEM — R29.818 SUSPECTED SLEEP APNEA: Status: ACTIVE | Noted: 2023-09-15

## 2023-10-01 DIAGNOSIS — G47.33 OBSTRUCTIVE SLEEP APNEA (ADULT) (PEDIATRIC): Primary | ICD-10-CM

## 2023-10-02 ENCOUNTER — HOSPITAL ENCOUNTER (OUTPATIENT)
Dept: SLEEP MEDICINE | Facility: HOSPITAL | Age: 33
Discharge: HOME | End: 2023-10-02
Attending: NURSE PRACTITIONER
Payer: MEDICAID

## 2023-10-02 DIAGNOSIS — G47.33 OBSTRUCTIVE SLEEP APNEA (ADULT) (PEDIATRIC): ICD-10-CM

## 2023-10-02 PROCEDURE — 95811 POLYSOM 6/>YRS CPAP 4/> PARM: CPT

## 2023-10-03 PROBLEM — G47.33 OBSTRUCTIVE SLEEP APNEA (ADULT) (PEDIATRIC): Status: ACTIVE | Noted: 2023-10-03
